# Patient Record
Sex: FEMALE | Race: WHITE | Employment: OTHER | URBAN - METROPOLITAN AREA
[De-identification: names, ages, dates, MRNs, and addresses within clinical notes are randomized per-mention and may not be internally consistent; named-entity substitution may affect disease eponyms.]

---

## 2021-12-23 ENCOUNTER — APPOINTMENT (OUTPATIENT)
Dept: GENERAL RADIOLOGY | Age: 86
End: 2021-12-23
Payer: MEDICARE

## 2021-12-23 ENCOUNTER — HOSPITAL ENCOUNTER (EMERGENCY)
Age: 86
Discharge: HOME | End: 2021-12-23
Attending: EMERGENCY MEDICINE
Payer: MEDICARE

## 2021-12-23 VITALS
DIASTOLIC BLOOD PRESSURE: 90 MMHG | BODY MASS INDEX: 25.13 KG/M2 | TEMPERATURE: 97.3 F | WEIGHT: 128 LBS | SYSTOLIC BLOOD PRESSURE: 168 MMHG | RESPIRATION RATE: 16 BRPM | OXYGEN SATURATION: 100 % | HEIGHT: 60 IN | HEART RATE: 79 BPM

## 2021-12-23 DIAGNOSIS — J40 BRONCHITIS: Primary | ICD-10-CM

## 2021-12-23 LAB
RAPID INFLUENZA  B AGN: NEGATIVE
RAPID INFLUENZA A AGN: NEGATIVE

## 2021-12-23 PROCEDURE — U0005 INFEC AGEN DETEC AMPLI PROBE: HCPCS

## 2021-12-23 PROCEDURE — U0003 INFECTIOUS AGENT DETECTION BY NUCLEIC ACID (DNA OR RNA); SEVERE ACUTE RESPIRATORY SYNDROME CORONAVIRUS 2 (SARS-COV-2) (CORONAVIRUS DISEASE [COVID-19]), AMPLIFIED PROBE TECHNIQUE, MAKING USE OF HIGH THROUGHPUT TECHNOLOGIES AS DESCRIBED BY CMS-2020-01-R: HCPCS

## 2021-12-23 PROCEDURE — 71045 X-RAY EXAM CHEST 1 VIEW: CPT

## 2021-12-23 PROCEDURE — 99284 EMERGENCY DEPT VISIT MOD MDM: CPT

## 2021-12-23 PROCEDURE — 87804 INFLUENZA ASSAY W/OPTIC: CPT

## 2021-12-23 PROCEDURE — 6370000000 HC RX 637 (ALT 250 FOR IP): Performed by: EMERGENCY MEDICINE

## 2021-12-23 RX ORDER — PREDNISONE 20 MG/1
40 TABLET ORAL DAILY
Qty: 8 TABLET | Refills: 0 | Status: SHIPPED | OUTPATIENT
Start: 2021-12-24 | End: 2021-12-28

## 2021-12-23 RX ORDER — PREDNISONE 20 MG/1
40 TABLET ORAL ONCE
Status: COMPLETED | OUTPATIENT
Start: 2021-12-23 | End: 2021-12-23

## 2021-12-23 RX ORDER — ALBUTEROL SULFATE 90 UG/1
2 AEROSOL, METERED RESPIRATORY (INHALATION) 4 TIMES DAILY PRN
Qty: 18 G | Refills: 0 | Status: SHIPPED | OUTPATIENT
Start: 2021-12-23

## 2021-12-23 RX ORDER — BENZONATATE 100 MG/1
100 CAPSULE ORAL 2 TIMES DAILY PRN
Qty: 14 CAPSULE | Refills: 0 | Status: SHIPPED | OUTPATIENT
Start: 2021-12-23 | End: 2021-12-30

## 2021-12-23 RX ORDER — ALBUTEROL SULFATE 90 UG/1
2 AEROSOL, METERED RESPIRATORY (INHALATION) ONCE
Status: COMPLETED | OUTPATIENT
Start: 2021-12-23 | End: 2021-12-23

## 2021-12-23 RX ADMIN — ALBUTEROL SULFATE 2 PUFF: 90 AEROSOL, METERED RESPIRATORY (INHALATION) at 17:58

## 2021-12-23 RX ADMIN — PREDNISONE 40 MG: 20 TABLET ORAL at 17:38

## 2021-12-23 ASSESSMENT — PAIN SCALES - GENERAL: PAINLEVEL_OUTOF10: 8

## 2021-12-23 NOTE — ED PROVIDER NOTES
EMERGENCY DEPARTMENT ENCOUNTER    Patient: Nate Raygoza  MRN: [id number]  : 1934  Date of Evaluation: 2021  ED Provider:  Soumya Mathis MD    CHIEF COMPLAINT  Chief Complaint   Patient presents with    URI       HPI  Nate Raygoza is a 80 y.o. female who presents with generalized body aches, generalized fatigue and malaise, nonproductive cough, chills and wheezing for the last week. The symptoms are moderate in severity and constant with no known trigger modifying factors. Has also had a mild frontal headache and mild nausea without emesis associated with this. Denies chest pain or difficulty breathing otherwise. Denies any other associated symptoms or complaints or concerns. Of note, she reports that she has been vaccinated for COVID-19. REVIEW OF SYSTEMS    Constitutional: negative for fever  Neurological: negative for focal weakness, loss of sensation  Ophthalmic: negative for vision change  ENT: negative for sore throat, earaches  Cardiovascular: negative for chest pain  Respiratory: negative for SOB, sputum  GI: negative for abdominal pain, vomiting, diarrhea, constipation  : negative for dysuria, hematuria  Musculoskeletal: negative for neck stiffness, decreased ROM, joint swelling  Dermatological: negative for rash, wounds  Heme: Negative for bleeding, bruising      PAST MEDICAL HISTORY  Past Medical History:   Diagnosis Date    Acid reflux     Heart disease     Hypertension     low       CURRENT MEDICATIONS  [unfilled]    ALLERGIES  No Known Allergies    SURGICAL HISTORY  Past Surgical History:   Procedure Laterality Date    ABDOMEN SURGERY      ulcer    APPENDECTOMY      CHOLECYSTECTOMY         FAMILY HISTORY  History reviewed. No pertinent family history. SOCIAL HISTORY  Social History     Socioeconomic History    Marital status:       Spouse name: None    Number of children: None    Years of education: None    Highest education level: None Occupational History    None   Tobacco Use    Smoking status: Never Smoker    Smokeless tobacco: None   Substance and Sexual Activity    Alcohol use: No    Drug use: None    Sexual activity: None   Other Topics Concern    None   Social History Narrative    None     Social Determinants of Health     Financial Resource Strain:     Difficulty of Paying Living Expenses: Not on file   Food Insecurity:     Worried About Running Out of Food in the Last Year: Not on file    Patrice of Food in the Last Year: Not on file   Transportation Needs:     Lack of Transportation (Medical): Not on file    Lack of Transportation (Non-Medical):  Not on file   Physical Activity:     Days of Exercise per Week: Not on file    Minutes of Exercise per Session: Not on file   Stress:     Feeling of Stress : Not on file   Social Connections:     Frequency of Communication with Friends and Family: Not on file    Frequency of Social Gatherings with Friends and Family: Not on file    Attends Jain Services: Not on file    Active Member of [address] [place] [address] or Organizations: Not on file    Attends Club or Organization Meetings: Not on file    Marital Status: Not on file   Intimate Partner Violence:     Fear of Current or Ex-Partner: Not on file    Emotionally Abused: Not on file    Physically Abused: Not on file    Sexually Abused: Not on file   Housing Stability:     Unable to Pay for Housing in the Last Year: Not on file    Number of [place] in the Last Year: Not on file    Unstable Housing in the Last Year: Not on file         **Past medical, family and social histories, and nursing notes reviewed and verified by me**      PHYSICAL EXAM  VITAL SIGNS:   ED Triage Vitals [12/23/21 1717]   Enc Vitals Group      BP (!) 168/90      Pulse 79      Resp 16      Temp 97.3 °F (36.3 °C)      Temp Source Infrared      SpO2 100 %      Weight 128 lb (58.1 kg)      Height 5' (1.524 m)      Head Circumference       Peak Flow Pain Score       Pain Loc       Pain Edu? Excl. in [address]? Vitals during ED course were reviewed and are as charted. Constitutional: Minimal distress, Non-toxic appearance  Eyes: Conjunctiva normal, No discharge  HENT: Normocephalic, Atraumatic, bilateral external ears normal, bilateral TMs appear normal,  no tenderness to percussion over the frontal or bilateral maxillary sinuses, posterior oropharynx is nonerythematous and without exudate, uvula is midline, oropharynx moist  Neck: Supple, no nuchal rigidity/meningismus, no stridor, no grossly visible or palpable masses  Cardiovascular: Regular rate and rhythm, No murmurs, No rubs, No gallops  Pulmonary/Chest: Diffuse bilateral wheezing, otherwise no respiratory distress or accessory muscle use, No crackles or rhonchi. Abdomen: Soft, nondistended and nonrigid, No tenderness or peritoneal signs, No masses, normal bowel sounds  Back: No midline point tenderness, No paraspinous muscle tenderness. No CVA tenderness  Extremities: No gross deformities, no edema, no tenderness  Neurologic: Normal motor function, Normal sensory function, No focal deficits  Skin: Warm, Dry, No erythema, No rash, No cyanosis, No mottling  Lymphatic: No lymphadenopathy in the following location(s): cervical  Psychiatric: Alert and oriented x3, Affect normal              RADIOLOGY/PROCEDURES/LABS/MEDICATIONS ADMINISTERED:    I have reviewed and interpreted all of the currently available lab results from this visit (if applicable):  Results for orders placed or performed during the hospital encounter of 12/23/21   Rapid Flu Swab    Specimen: Nasopharyngeal   Result Value Ref Range    Rapid Influenza A Ag NEGATIVE NEGATIVE    Rapid Influenza B Ag NEGATIVE NEGATIVE          ABNORMAL LABS:  Labs Reviewed   RAPID INFLUENZA A/B ANTIGENS   COVID-19         IMAGING STUDIES ORDERED:  XR CHEST PORTABLE    I have personally viewed the imaging studies.  The radiologist interpretation is:   XR CHEST PORTABLE   Preliminary Result   Stable chest with scarring in the right lung base. No obvious superimposed   acute process. MEDICATIONS ADMINISTERED:  Medications   albuterol sulfate  (90 Base) MCG/ACT inhaler 2 puff (2 puffs Inhalation Given 12/23/21 1758)   predniSONE (DELTASONE) tablet 40 mg (40 mg Oral Given 12/23/21 1738)         COURSE & MEDICAL DECISION MAKING  Last vitals: BP (!) 168/90   Pulse 79   Temp 97.3 °F (36.3 °C) (Infrared)   Resp 16   Ht 5' (1.524 m)   Wt 128 lb (58.1 kg)   SpO2 100%   BMI 25.00 kg/m²     80year-old female with likely viral bronchitis. No radiographic evidence or clinical suggestion of pneumonia. Vital signs reassuring and stable. Not in any respiratory distress. Of note, the patient's initial vital signs were checked right after she was roomed. She had just walked down the long hallway to the room when these were checked. Heart rate was within normal limits and her oxygen saturation was 100% on room air. Negative for influenza. COVID-19 testing has been obtained and has been sent out and pending. Was given some steroids and breathing treatment with improvement in her symptoms and wheezing. Additional workup and treatment in the ED as documented above. Patient reassured and will be discharged home. I have explained to the patient in appropriate terminology our work-up in the ED and their diagnosis. I have also given anticipatory guidance and expectant management of their condition as an outpatient as per my custom. The patient was given clear discharge and follow-up instructions including return to the ER immediately for worsening concerns. The patient has been advised to follow-up with their primary care physician and/or referred physician in the next two to three days or sooner if worsening and to return to the ER immediately as above with any concerns.  I provided the patient counseling with regard to my customary list of strict return precautions as well as return precautions specific to the cause for today's emergency department visit. The patient will return under these provided conditions, but should also return for new concerns or further worsening. Pt and/or family understand and agree with plan. Clinical Impression:  1. Bronchitis        Disposition referral (if applicable):  DO Taryn Mcneill [address] [place] [address] [address]  Magy Valdez [address]  [address]  [phone]    If symptoms worsen      Disposition medications (if applicable):  New Prescriptions    ALBUTEROL SULFATE HFA (VENTOLIN HFA) 108 (90 BASE) MCG/ACT INHALER    Inhale 2 puffs into the lungs 4 times daily as needed for Wheezing    BENZONATATE (TESSALON) 100 MG CAPSULE    Take 1 capsule by mouth 2 times daily as needed for Cough    PREDNISONE (DELTASONE) 20 MG TABLET    Take 2 tablets by mouth daily for 4 days       ED Provider Disposition Time  DISPOSITION            Electronically signed by: Shirley Douglass M.D., 12/23/2021 7:18 PM      This dictation was created with voice recognition software. While attempts have been made to review the dictation as it is transcribed, on occasion the spoken word can be misinterpreted by the technology leading to omissions or inappropriate words, phrases or sentences.         Michelle Treviño MD  12/23/21 8423

## 2021-12-25 LAB
SARS-COV-2: NOT DETECTED
SOURCE: NORMAL

## 2023-06-10 ENCOUNTER — APPOINTMENT (OUTPATIENT)
Dept: CT IMAGING | Age: 88
DRG: 064 | End: 2023-06-10
Payer: MEDICARE

## 2023-06-10 ENCOUNTER — HOSPITAL ENCOUNTER (INPATIENT)
Age: 88
LOS: 5 days | Discharge: NURSING, REHAB OR LONG-TERM CARE | DRG: 064 | End: 2023-06-16
Attending: EMERGENCY MEDICINE | Admitting: INTERNAL MEDICINE
Payer: MEDICARE

## 2023-06-10 DIAGNOSIS — R47.01 APHASIA: ICD-10-CM

## 2023-06-10 DIAGNOSIS — D64.9 ANEMIA, UNSPECIFIED TYPE: ICD-10-CM

## 2023-06-10 DIAGNOSIS — R41.82 ALTERED MENTAL STATUS, UNSPECIFIED ALTERED MENTAL STATUS TYPE: Primary | ICD-10-CM

## 2023-06-10 DIAGNOSIS — R77.8 ELEVATED TROPONIN: ICD-10-CM

## 2023-06-10 LAB
AMMONIA: 22 UMOL/L (ref 11–51)
BASOPHILS ABSOLUTE: 0 K/CU MM
BASOPHILS RELATIVE PERCENT: 0.4 % (ref 0–1)
DIFFERENTIAL TYPE: ABNORMAL
EOSINOPHILS ABSOLUTE: 0.1 K/CU MM
EOSINOPHILS RELATIVE PERCENT: 1.3 % (ref 0–3)
GLUCOSE BLD-MCNC: 123 MG/DL (ref 70–99)
HCT VFR BLD CALC: 31.3 % (ref 37–47)
HEMOGLOBIN: 8.6 GM/DL (ref 12.5–16)
IMMATURE NEUTROPHIL %: 0.5 % (ref 0–0.43)
LYMPHOCYTES ABSOLUTE: 0.9 K/CU MM
LYMPHOCYTES RELATIVE PERCENT: 8.2 % (ref 24–44)
MCH RBC QN AUTO: 24.3 PG (ref 27–31)
MCHC RBC AUTO-ENTMCNC: 27.5 % (ref 32–36)
MCV RBC AUTO: 88.4 FL (ref 78–100)
MONOCYTES ABSOLUTE: 1 K/CU MM
MONOCYTES RELATIVE PERCENT: 9.6 % (ref 0–4)
NUCLEATED RBC %: 0 %
PDW BLD-RTO: 16.6 % (ref 11.7–14.9)
PLATELET # BLD: 407 K/CU MM (ref 140–440)
PMV BLD AUTO: 9.8 FL (ref 7.5–11.1)
RBC # BLD: 3.54 M/CU MM (ref 4.2–5.4)
SEGMENTED NEUTROPHILS ABSOLUTE COUNT: 8.3 K/CU MM
SEGMENTED NEUTROPHILS RELATIVE PERCENT: 80 % (ref 36–66)
TOTAL IMMATURE NEUTOROPHIL: 0.05 K/CU MM
TOTAL NUCLEATED RBC: 0 K/CU MM
WBC # BLD: 10.4 K/CU MM (ref 4–10.5)

## 2023-06-10 PROCEDURE — 82140 ASSAY OF AMMONIA: CPT

## 2023-06-10 PROCEDURE — 84439 ASSAY OF FREE THYROXINE: CPT

## 2023-06-10 PROCEDURE — 93005 ELECTROCARDIOGRAM TRACING: CPT | Performed by: EMERGENCY MEDICINE

## 2023-06-10 PROCEDURE — 80048 BASIC METABOLIC PNL TOTAL CA: CPT

## 2023-06-10 PROCEDURE — 84484 ASSAY OF TROPONIN QUANT: CPT

## 2023-06-10 PROCEDURE — 6360000004 HC RX CONTRAST MEDICATION: Performed by: EMERGENCY MEDICINE

## 2023-06-10 PROCEDURE — 82962 GLUCOSE BLOOD TEST: CPT

## 2023-06-10 PROCEDURE — 84443 ASSAY THYROID STIM HORMONE: CPT

## 2023-06-10 PROCEDURE — 70498 CT ANGIOGRAPHY NECK: CPT

## 2023-06-10 PROCEDURE — 81003 URINALYSIS AUTO W/O SCOPE: CPT

## 2023-06-10 PROCEDURE — 85025 COMPLETE CBC W/AUTO DIFF WBC: CPT

## 2023-06-10 PROCEDURE — 82805 BLOOD GASES W/O2 SATURATION: CPT

## 2023-06-10 PROCEDURE — 99285 EMERGENCY DEPT VISIT HI MDM: CPT

## 2023-06-10 PROCEDURE — 70450 CT HEAD/BRAIN W/O DYE: CPT

## 2023-06-10 RX ADMIN — IOPAMIDOL 75 ML: 755 INJECTION, SOLUTION INTRAVENOUS at 22:47

## 2023-06-11 ENCOUNTER — APPOINTMENT (OUTPATIENT)
Dept: GENERAL RADIOLOGY | Age: 88
DRG: 064 | End: 2023-06-11
Payer: MEDICARE

## 2023-06-11 PROBLEM — R41.82 CHANGE IN MENTAL STATUS: Status: ACTIVE | Noted: 2023-06-11

## 2023-06-11 LAB
ABO/RH: NORMAL
ANION GAP SERPL CALCULATED.3IONS-SCNC: 11 MMOL/L (ref 4–16)
ANION GAP SERPL CALCULATED.3IONS-SCNC: 14 MMOL/L (ref 4–16)
ANTIBODY SCREEN: NEGATIVE
BASE EXCESS MIXED: 12 (ref 0–2.3)
BASE EXCESS MIXED: 12.2 (ref 0–2.3)
BASOPHILS ABSOLUTE: 0 K/CU MM
BASOPHILS RELATIVE PERCENT: 0.3 % (ref 0–1)
BILIRUBIN URINE: NEGATIVE MG/DL
BLOOD, URINE: NEGATIVE
BUN SERPL-MCNC: 23 MG/DL (ref 6–23)
BUN SERPL-MCNC: 25 MG/DL (ref 6–23)
CALCIUM SERPL-MCNC: 7.4 MG/DL (ref 8.3–10.6)
CALCIUM SERPL-MCNC: 7.4 MG/DL (ref 8.3–10.6)
CHLORIDE BLD-SCNC: 94 MMOL/L (ref 99–110)
CHLORIDE BLD-SCNC: 95 MMOL/L (ref 99–110)
CHOLEST SERPL-MCNC: 97 MG/DL
CLARITY: CLEAR
CO2: 29 MMOL/L (ref 21–32)
CO2: 33 MMOL/L (ref 21–32)
COLOR: YELLOW
COMMENT UA: NORMAL
COMMENT: ABNORMAL
CREAT SERPL-MCNC: 0.8 MG/DL (ref 0.6–1.1)
CREAT SERPL-MCNC: 0.9 MG/DL (ref 0.6–1.1)
DIFFERENTIAL TYPE: ABNORMAL
DIGOXIN LEVEL: 1 NG/ML (ref 0.8–2)
DOSE AMOUNT: NORMAL
DOSE TIME: NORMAL
EKG ATRIAL RATE: 78 BPM
EKG DIAGNOSIS: NORMAL
EKG Q-T INTERVAL: 534 MS
EKG QRS DURATION: 64 MS
EKG QTC CALCULATION (BAZETT): 584 MS
EKG R AXIS: 8 DEGREES
EKG T AXIS: 237 DEGREES
EKG VENTRICULAR RATE: 72 BPM
EOSINOPHILS ABSOLUTE: 0 K/CU MM
EOSINOPHILS RELATIVE PERCENT: 0.2 % (ref 0–3)
GFR SERPL CREATININE-BSD FRML MDRD: >60 ML/MIN/1.73M2
GFR SERPL CREATININE-BSD FRML MDRD: >60 ML/MIN/1.73M2
GLUCOSE SERPL-MCNC: 105 MG/DL (ref 70–99)
GLUCOSE SERPL-MCNC: 114 MG/DL (ref 70–99)
GLUCOSE, URINE: NEGATIVE MG/DL
HCO3 VENOUS: 34.4 MMOL/L (ref 19–25)
HCO3 VENOUS: 39.3 MMOL/L (ref 19–25)
HCT VFR BLD CALC: 31.9 % (ref 37–47)
HDLC SERPL-MCNC: 30 MG/DL
HEMOGLOBIN: 8.8 GM/DL (ref 12.5–16)
IMMATURE NEUTROPHIL %: 0.3 % (ref 0–0.43)
KETONES, URINE: NEGATIVE MG/DL
LDLC SERPL CALC-MCNC: 40 MG/DL
LEUKOCYTE ESTERASE, URINE: NEGATIVE
LYMPHOCYTES ABSOLUTE: 0.3 K/CU MM
LYMPHOCYTES RELATIVE PERCENT: 3 % (ref 24–44)
MCH RBC QN AUTO: 24.4 PG (ref 27–31)
MCHC RBC AUTO-ENTMCNC: 27.6 % (ref 32–36)
MCV RBC AUTO: 88.6 FL (ref 78–100)
MONOCYTES ABSOLUTE: 0.5 K/CU MM
MONOCYTES RELATIVE PERCENT: 5.1 % (ref 0–4)
NITRITE URINE, QUANTITATIVE: NEGATIVE
NUCLEATED RBC %: 0 %
O2 SAT, VEN: 87.7 % (ref 50–70)
O2 SAT, VEN: 90.5 % (ref 50–70)
PCO2, VEN: 35 MMHG (ref 38–52)
PCO2, VEN: 62 MMHG (ref 38–52)
PDW BLD-RTO: 16.4 % (ref 11.7–14.9)
PH VENOUS: 7.41 (ref 7.32–7.42)
PH VENOUS: 7.6 (ref 7.32–7.42)
PH, URINE: 8 (ref 5–8)
PLATELET # BLD: 401 K/CU MM (ref 140–440)
PMV BLD AUTO: 10.1 FL (ref 7.5–11.1)
PO2, VEN: 220 MMHG (ref 28–48)
PO2, VEN: 74 MMHG (ref 28–48)
POTASSIUM SERPL-SCNC: 3.6 MMOL/L (ref 3.5–5.1)
POTASSIUM SERPL-SCNC: 4.1 MMOL/L (ref 3.5–5.1)
PROTEIN UA: NEGATIVE MG/DL
RBC # BLD: 3.6 M/CU MM (ref 4.2–5.4)
SEGMENTED NEUTROPHILS ABSOLUTE COUNT: 8.1 K/CU MM
SEGMENTED NEUTROPHILS RELATIVE PERCENT: 91.1 % (ref 36–66)
SODIUM BLD-SCNC: 138 MMOL/L (ref 135–145)
SODIUM BLD-SCNC: 138 MMOL/L (ref 135–145)
SPECIFIC GRAVITY UA: 1.01 (ref 1–1.03)
T4 FREE SERPL-MCNC: 1.5 NG/DL (ref 0.9–1.8)
TOTAL IMMATURE NEUTOROPHIL: 0.03 K/CU MM
TOTAL NUCLEATED RBC: 0 K/CU MM
TRIGL SERPL-MCNC: 135 MG/DL
TROPONIN T: 0.01 NG/ML
TROPONIN T: 0.02 NG/ML
TSH SERPL DL<=0.005 MIU/L-ACNC: 1.37 UIU/ML (ref 0.27–4.2)
UROBILINOGEN, URINE: 0.2 MG/DL (ref 0.2–1)
WBC # BLD: 8.9 K/CU MM (ref 4–10.5)

## 2023-06-11 PROCEDURE — 6360000002 HC RX W HCPCS: Performed by: INTERNAL MEDICINE

## 2023-06-11 PROCEDURE — 86901 BLOOD TYPING SEROLOGIC RH(D): CPT

## 2023-06-11 PROCEDURE — 82805 BLOOD GASES W/O2 SATURATION: CPT

## 2023-06-11 PROCEDURE — 99223 1ST HOSP IP/OBS HIGH 75: CPT | Performed by: PSYCHIATRY & NEUROLOGY

## 2023-06-11 PROCEDURE — C9113 INJ PANTOPRAZOLE SODIUM, VIA: HCPCS | Performed by: INTERNAL MEDICINE

## 2023-06-11 PROCEDURE — 94761 N-INVAS EAR/PLS OXIMETRY MLT: CPT

## 2023-06-11 PROCEDURE — 71045 X-RAY EXAM CHEST 1 VIEW: CPT

## 2023-06-11 PROCEDURE — 1200000000 HC SEMI PRIVATE

## 2023-06-11 PROCEDURE — 2700000000 HC OXYGEN THERAPY PER DAY

## 2023-06-11 PROCEDURE — 97163 PT EVAL HIGH COMPLEX 45 MIN: CPT

## 2023-06-11 PROCEDURE — 80048 BASIC METABOLIC PNL TOTAL CA: CPT

## 2023-06-11 PROCEDURE — 86900 BLOOD TYPING SEROLOGIC ABO: CPT

## 2023-06-11 PROCEDURE — 93010 ELECTROCARDIOGRAM REPORT: CPT | Performed by: INTERNAL MEDICINE

## 2023-06-11 PROCEDURE — 86850 RBC ANTIBODY SCREEN: CPT

## 2023-06-11 PROCEDURE — 84484 ASSAY OF TROPONIN QUANT: CPT

## 2023-06-11 PROCEDURE — 2580000003 HC RX 258: Performed by: INTERNAL MEDICINE

## 2023-06-11 PROCEDURE — 51702 INSERT TEMP BLADDER CATH: CPT

## 2023-06-11 PROCEDURE — 4A03X5D MEASUREMENT OF ARTERIAL FLOW, INTRACRANIAL, EXTERNAL APPROACH: ICD-10-PCS | Performed by: PSYCHIATRY & NEUROLOGY

## 2023-06-11 PROCEDURE — 80061 LIPID PANEL: CPT

## 2023-06-11 PROCEDURE — 85025 COMPLETE CBC W/AUTO DIFF WBC: CPT

## 2023-06-11 PROCEDURE — 36415 COLL VENOUS BLD VENIPUNCTURE: CPT

## 2023-06-11 PROCEDURE — 80162 ASSAY OF DIGOXIN TOTAL: CPT

## 2023-06-11 RX ORDER — FUROSEMIDE 40 MG/1
80 TABLET ORAL DAILY
Status: ON HOLD | COMMUNITY

## 2023-06-11 RX ORDER — ONDANSETRON 2 MG/ML
4 INJECTION INTRAMUSCULAR; INTRAVENOUS EVERY 6 HOURS PRN
Status: DISCONTINUED | OUTPATIENT
Start: 2023-06-11 | End: 2023-06-16 | Stop reason: HOSPADM

## 2023-06-11 RX ORDER — PROCHLORPERAZINE MALEATE 5 MG/1
5 TABLET ORAL EVERY 6 HOURS PRN
Status: ON HOLD | COMMUNITY

## 2023-06-11 RX ORDER — ROPINIROLE 2 MG/1
2 TABLET, FILM COATED ORAL NIGHTLY
Status: ON HOLD | COMMUNITY

## 2023-06-11 RX ORDER — DIGOXIN 125 MCG
125 TABLET ORAL DAILY
Status: ON HOLD | COMMUNITY

## 2023-06-11 RX ORDER — ENOXAPARIN SODIUM 100 MG/ML
40 INJECTION SUBCUTANEOUS DAILY
Status: DISCONTINUED | OUTPATIENT
Start: 2023-06-11 | End: 2023-06-15

## 2023-06-11 RX ORDER — IRON ASPGLY,PS/C/B12/FA/CA/SUC 150-25-1
1 CAPSULE ORAL 2 TIMES DAILY WITH MEALS
Status: ON HOLD | COMMUNITY

## 2023-06-11 RX ORDER — ALPRAZOLAM 1 MG/1
1 TABLET ORAL EVERY 12 HOURS
Status: ON HOLD | COMMUNITY

## 2023-06-11 RX ORDER — POTASSIUM CHLORIDE 20 MEQ/1
20 TABLET, EXTENDED RELEASE ORAL DAILY
Status: ON HOLD | COMMUNITY

## 2023-06-11 RX ORDER — POLYETHYLENE GLYCOL 3350 17 G/17G
17 POWDER, FOR SOLUTION ORAL DAILY PRN
Status: DISCONTINUED | OUTPATIENT
Start: 2023-06-11 | End: 2023-06-16 | Stop reason: HOSPADM

## 2023-06-11 RX ORDER — POLYETHYLENE GLYCOL 3350 17 G/17G
17 POWDER, FOR SOLUTION ORAL DAILY PRN
Status: ON HOLD | COMMUNITY

## 2023-06-11 RX ORDER — ACETAMINOPHEN 650 MG/1
650 SUPPOSITORY RECTAL EVERY 4 HOURS PRN
Status: DISCONTINUED | OUTPATIENT
Start: 2023-06-11 | End: 2023-06-16 | Stop reason: HOSPADM

## 2023-06-11 RX ORDER — DEXTROSE AND SODIUM CHLORIDE 5; .9 G/100ML; G/100ML
INJECTION, SOLUTION INTRAVENOUS CONTINUOUS
Status: DISCONTINUED | OUTPATIENT
Start: 2023-06-11 | End: 2023-06-14

## 2023-06-11 RX ORDER — ATORVASTATIN CALCIUM 40 MG/1
40 TABLET, FILM COATED ORAL NIGHTLY
Status: DISCONTINUED | OUTPATIENT
Start: 2023-06-11 | End: 2023-06-16 | Stop reason: HOSPADM

## 2023-06-11 RX ORDER — PANTOPRAZOLE SODIUM 40 MG/10ML
40 INJECTION, POWDER, LYOPHILIZED, FOR SOLUTION INTRAVENOUS DAILY
Status: DISCONTINUED | OUTPATIENT
Start: 2023-06-11 | End: 2023-06-12

## 2023-06-11 RX ORDER — HYDROCODONE BITARTRATE AND ACETAMINOPHEN 10; 325 MG/1; MG/1
1 TABLET ORAL EVERY 6 HOURS PRN
Status: ON HOLD | COMMUNITY

## 2023-06-11 RX ORDER — PHENAZOPYRIDINE HYDROCHLORIDE 200 MG/1
200 TABLET, FILM COATED ORAL 3 TIMES DAILY PRN
Status: ON HOLD | COMMUNITY

## 2023-06-11 RX ORDER — ONDANSETRON 4 MG/1
4 TABLET, ORALLY DISINTEGRATING ORAL EVERY 8 HOURS PRN
Status: DISCONTINUED | OUTPATIENT
Start: 2023-06-11 | End: 2023-06-16 | Stop reason: HOSPADM

## 2023-06-11 RX ADMIN — ENOXAPARIN SODIUM 40 MG: 100 INJECTION SUBCUTANEOUS at 09:10

## 2023-06-11 RX ADMIN — PANTOPRAZOLE SODIUM 40 MG: 40 INJECTION, POWDER, FOR SOLUTION INTRAVENOUS at 21:01

## 2023-06-11 RX ADMIN — DEXTROSE AND SODIUM CHLORIDE: 5; 900 INJECTION, SOLUTION INTRAVENOUS at 21:01

## 2023-06-11 RX ADMIN — CEFTRIAXONE SODIUM 1000 MG: 1 INJECTION, POWDER, FOR SOLUTION INTRAMUSCULAR; INTRAVENOUS at 05:28

## 2023-06-12 ENCOUNTER — APPOINTMENT (OUTPATIENT)
Dept: ULTRASOUND IMAGING | Age: 88
DRG: 064 | End: 2023-06-12
Payer: MEDICARE

## 2023-06-12 ENCOUNTER — APPOINTMENT (OUTPATIENT)
Dept: MRI IMAGING | Age: 88
DRG: 064 | End: 2023-06-12
Payer: MEDICARE

## 2023-06-12 PROBLEM — I63.9 CEREBROVASCULAR ACCIDENT (CVA) DUE TO EMBOLISM (HCC): Status: ACTIVE | Noted: 2023-06-12

## 2023-06-12 PROBLEM — I48.19 PERSISTENT ATRIAL FIBRILLATION (HCC): Status: ACTIVE | Noted: 2023-06-12

## 2023-06-12 LAB
ALBUMIN SERPL-MCNC: 2.4 GM/DL (ref 3.4–5)
ALBUMIN SERPL-MCNC: 2.6 GM/DL (ref 3.4–5)
ALP BLD-CCNC: 148 IU/L (ref 40–128)
ALP BLD-CCNC: 151 IU/L (ref 40–129)
ALT SERPL-CCNC: 13 U/L (ref 10–40)
ALT SERPL-CCNC: 13 U/L (ref 10–40)
ANION GAP SERPL CALCULATED.3IONS-SCNC: 10 MMOL/L (ref 4–16)
ANION GAP SERPL CALCULATED.3IONS-SCNC: 13 MMOL/L (ref 4–16)
AST SERPL-CCNC: 16 IU/L (ref 15–37)
AST SERPL-CCNC: 22 IU/L (ref 15–37)
B PARAP IS1001 DNA NPH QL NAA+NON-PROBE: NOT DETECTED
B PERT.PT PRMT NPH QL NAA+NON-PROBE: NOT DETECTED
BASE EXCESS MIXED: 10.6 (ref 0–2.3)
BASOPHILS ABSOLUTE: 0 K/CU MM
BASOPHILS ABSOLUTE: 0 K/CU MM
BASOPHILS RELATIVE PERCENT: 0.1 % (ref 0–1)
BASOPHILS RELATIVE PERCENT: 0.3 % (ref 0–1)
BILIRUB SERPL-MCNC: 0.2 MG/DL (ref 0–1)
BILIRUB SERPL-MCNC: 0.2 MG/DL (ref 0–1)
BUN SERPL-MCNC: 21 MG/DL (ref 6–23)
BUN SERPL-MCNC: 22 MG/DL (ref 6–23)
C PNEUM DNA NPH QL NAA+NON-PROBE: NOT DETECTED
CALCIUM SERPL-MCNC: 7.7 MG/DL (ref 8.3–10.6)
CALCIUM SERPL-MCNC: 7.7 MG/DL (ref 8.3–10.6)
CHLORIDE BLD-SCNC: 102 MMOL/L (ref 99–110)
CHLORIDE BLD-SCNC: 98 MMOL/L (ref 99–110)
CO2: 27 MMOL/L (ref 21–32)
CO2: 30 MMOL/L (ref 21–32)
COMMENT: ABNORMAL
CREAT SERPL-MCNC: 0.8 MG/DL (ref 0.6–1.1)
CREAT SERPL-MCNC: 0.9 MG/DL (ref 0.6–1.1)
DIFFERENTIAL TYPE: ABNORMAL
DIFFERENTIAL TYPE: ABNORMAL
EKG ATRIAL RATE: 40 BPM
EKG DIAGNOSIS: NORMAL
EKG Q-T INTERVAL: 288 MS
EKG QRS DURATION: 70 MS
EKG QTC CALCULATION (BAZETT): 315 MS
EKG R AXIS: 29 DEGREES
EKG T AXIS: 228 DEGREES
EKG VENTRICULAR RATE: 72 BPM
EOSINOPHILS ABSOLUTE: 0 K/CU MM
EOSINOPHILS ABSOLUTE: 0 K/CU MM
EOSINOPHILS RELATIVE PERCENT: 0 % (ref 0–3)
EOSINOPHILS RELATIVE PERCENT: 0 % (ref 0–3)
FLUAV H1 2009 PAN RNA NPH NAA+NON-PROBE: NOT DETECTED
FLUAV H1 RNA NPH QL NAA+NON-PROBE: NOT DETECTED
FLUAV H3 RNA NPH QL NAA+NON-PROBE: NOT DETECTED
FLUAV RNA NPH QL NAA+NON-PROBE: NOT DETECTED
FLUBV RNA NPH QL NAA+NON-PROBE: NOT DETECTED
GFR SERPL CREATININE-BSD FRML MDRD: >60 ML/MIN/1.73M2
GFR SERPL CREATININE-BSD FRML MDRD: >60 ML/MIN/1.73M2
GLUCOSE SERPL-MCNC: 126 MG/DL (ref 70–99)
GLUCOSE SERPL-MCNC: 154 MG/DL (ref 70–99)
HADV DNA NPH QL NAA+NON-PROBE: NOT DETECTED
HCO3 VENOUS: 35.7 MMOL/L (ref 19–25)
HCOV 229E RNA NPH QL NAA+NON-PROBE: NOT DETECTED
HCOV HKU1 RNA NPH QL NAA+NON-PROBE: NOT DETECTED
HCOV NL63 RNA NPH QL NAA+NON-PROBE: NOT DETECTED
HCOV OC43 RNA NPH QL NAA+NON-PROBE: NOT DETECTED
HCT VFR BLD CALC: 27.3 % (ref 37–47)
HCT VFR BLD CALC: 31.4 % (ref 37–47)
HEMOGLOBIN: 7.4 GM/DL (ref 12.5–16)
HEMOGLOBIN: 8.4 GM/DL (ref 12.5–16)
HMPV RNA NPH QL NAA+NON-PROBE: NOT DETECTED
HPIV1 RNA NPH QL NAA+NON-PROBE: NOT DETECTED
HPIV2 RNA NPH QL NAA+NON-PROBE: NOT DETECTED
HPIV3 RNA NPH QL NAA+NON-PROBE: NOT DETECTED
HPIV4 RNA NPH QL NAA+NON-PROBE: NOT DETECTED
IMMATURE NEUTROPHIL %: 0.5 % (ref 0–0.43)
IMMATURE NEUTROPHIL %: 0.6 % (ref 0–0.43)
LACTIC ACID, SEPSIS: 1.2 MMOL/L (ref 0.5–1.9)
LACTIC ACID, SEPSIS: 2.2 MMOL/L (ref 0.5–1.9)
LV EF: 58 %
LVEF MODALITY: NORMAL
LYMPHOCYTES ABSOLUTE: 0.5 K/CU MM
LYMPHOCYTES ABSOLUTE: 0.5 K/CU MM
LYMPHOCYTES RELATIVE PERCENT: 5.7 % (ref 24–44)
LYMPHOCYTES RELATIVE PERCENT: 5.7 % (ref 24–44)
M PNEUMO DNA NPH QL NAA+NON-PROBE: NOT DETECTED
MAGNESIUM: 2.4 MG/DL (ref 1.8–2.4)
MCH RBC QN AUTO: 23.6 PG (ref 27–31)
MCH RBC QN AUTO: 24.2 PG (ref 27–31)
MCHC RBC AUTO-ENTMCNC: 26.8 % (ref 32–36)
MCHC RBC AUTO-ENTMCNC: 27.1 % (ref 32–36)
MCV RBC AUTO: 86.9 FL (ref 78–100)
MCV RBC AUTO: 90.5 FL (ref 78–100)
MONOCYTES ABSOLUTE: 0.6 K/CU MM
MONOCYTES ABSOLUTE: 0.8 K/CU MM
MONOCYTES RELATIVE PERCENT: 6.8 % (ref 0–4)
MONOCYTES RELATIVE PERCENT: 8.9 % (ref 0–4)
NUCLEATED RBC %: 0 %
NUCLEATED RBC %: 0 %
O2 SAT, VEN: 89 % (ref 50–70)
PCO2, VEN: 48 MMHG (ref 38–52)
PDW BLD-RTO: 16.6 % (ref 11.7–14.9)
PDW BLD-RTO: 16.6 % (ref 11.7–14.9)
PH VENOUS: 7.48 (ref 7.32–7.42)
PLATELET # BLD: 381 K/CU MM (ref 140–440)
PLATELET # BLD: 417 K/CU MM (ref 140–440)
PMV BLD AUTO: 9.4 FL (ref 7.5–11.1)
PMV BLD AUTO: 9.8 FL (ref 7.5–11.1)
PO2, VEN: 60 MMHG (ref 28–48)
POTASSIUM SERPL-SCNC: 3.9 MMOL/L (ref 3.5–5.1)
POTASSIUM SERPL-SCNC: 4 MMOL/L (ref 3.5–5.1)
PRO-BNP: 6899 PG/ML
PROCALCITONIN SERPL-MCNC: 0.14 NG/ML
RBC # BLD: 3.14 M/CU MM (ref 4.2–5.4)
RBC # BLD: 3.47 M/CU MM (ref 4.2–5.4)
RSV RNA NPH QL NAA+NON-PROBE: NOT DETECTED
RV+EV RNA NPH QL NAA+NON-PROBE: NOT DETECTED
SARS-COV-2 RNA NPH QL NAA+NON-PROBE: NOT DETECTED
SEGMENTED NEUTROPHILS ABSOLUTE COUNT: 7.4 K/CU MM
SEGMENTED NEUTROPHILS ABSOLUTE COUNT: 7.7 K/CU MM
SEGMENTED NEUTROPHILS RELATIVE PERCENT: 84.5 % (ref 36–66)
SEGMENTED NEUTROPHILS RELATIVE PERCENT: 86.9 % (ref 36–66)
SODIUM BLD-SCNC: 138 MMOL/L (ref 135–145)
SODIUM BLD-SCNC: 142 MMOL/L (ref 135–145)
TOTAL IMMATURE NEUTOROPHIL: 0.04 K/CU MM
TOTAL IMMATURE NEUTOROPHIL: 0.05 K/CU MM
TOTAL NUCLEATED RBC: 0 K/CU MM
TOTAL NUCLEATED RBC: 0 K/CU MM
TOTAL PROTEIN: 4.3 GM/DL (ref 6.4–8.2)
TOTAL PROTEIN: 4.8 GM/DL (ref 6.4–8.2)
WBC # BLD: 8.7 K/CU MM (ref 4–10.5)
WBC # BLD: 8.8 K/CU MM (ref 4–10.5)

## 2023-06-12 PROCEDURE — 1200000000 HC SEMI PRIVATE

## 2023-06-12 PROCEDURE — 6360000002 HC RX W HCPCS: Performed by: INTERNAL MEDICINE

## 2023-06-12 PROCEDURE — 6360000002 HC RX W HCPCS: Performed by: HOSPITALIST

## 2023-06-12 PROCEDURE — 6360000002 HC RX W HCPCS: Performed by: FAMILY MEDICINE

## 2023-06-12 PROCEDURE — 99232 SBSQ HOSP IP/OBS MODERATE 35: CPT | Performed by: NURSE PRACTITIONER

## 2023-06-12 PROCEDURE — 82805 BLOOD GASES W/O2 SATURATION: CPT

## 2023-06-12 PROCEDURE — A9579 GAD-BASE MR CONTRAST NOS,1ML: HCPCS | Performed by: INTERNAL MEDICINE

## 2023-06-12 PROCEDURE — 83880 ASSAY OF NATRIURETIC PEPTIDE: CPT

## 2023-06-12 PROCEDURE — 93010 ELECTROCARDIOGRAM REPORT: CPT | Performed by: INTERNAL MEDICINE

## 2023-06-12 PROCEDURE — 2580000003 HC RX 258: Performed by: INTERNAL MEDICINE

## 2023-06-12 PROCEDURE — C9113 INJ PANTOPRAZOLE SODIUM, VIA: HCPCS | Performed by: HOSPITALIST

## 2023-06-12 PROCEDURE — 2580000003 HC RX 258: Performed by: FAMILY MEDICINE

## 2023-06-12 PROCEDURE — 6360000002 HC RX W HCPCS: Performed by: NURSE PRACTITIONER

## 2023-06-12 PROCEDURE — 99223 1ST HOSP IP/OBS HIGH 75: CPT | Performed by: INTERNAL MEDICINE

## 2023-06-12 PROCEDURE — 87040 BLOOD CULTURE FOR BACTERIA: CPT

## 2023-06-12 PROCEDURE — 70553 MRI BRAIN STEM W/O & W/DYE: CPT

## 2023-06-12 PROCEDURE — 93005 ELECTROCARDIOGRAM TRACING: CPT | Performed by: FAMILY MEDICINE

## 2023-06-12 PROCEDURE — 83735 ASSAY OF MAGNESIUM: CPT

## 2023-06-12 PROCEDURE — 36415 COLL VENOUS BLD VENIPUNCTURE: CPT

## 2023-06-12 PROCEDURE — 6360000004 HC RX CONTRAST MEDICATION: Performed by: INTERNAL MEDICINE

## 2023-06-12 PROCEDURE — 80053 COMPREHEN METABOLIC PANEL: CPT

## 2023-06-12 PROCEDURE — 93971 EXTREMITY STUDY: CPT

## 2023-06-12 PROCEDURE — 93306 TTE W/DOPPLER COMPLETE: CPT

## 2023-06-12 PROCEDURE — 85025 COMPLETE CBC W/AUTO DIFF WBC: CPT

## 2023-06-12 PROCEDURE — 0202U NFCT DS 22 TRGT SARS-COV-2: CPT

## 2023-06-12 PROCEDURE — 84145 PROCALCITONIN (PCT): CPT

## 2023-06-12 PROCEDURE — 92610 EVALUATE SWALLOWING FUNCTION: CPT

## 2023-06-12 PROCEDURE — 94761 N-INVAS EAR/PLS OXIMETRY MLT: CPT

## 2023-06-12 PROCEDURE — 76937 US GUIDE VASCULAR ACCESS: CPT

## 2023-06-12 PROCEDURE — C9113 INJ PANTOPRAZOLE SODIUM, VIA: HCPCS | Performed by: INTERNAL MEDICINE

## 2023-06-12 PROCEDURE — 83605 ASSAY OF LACTIC ACID: CPT

## 2023-06-12 PROCEDURE — 6370000000 HC RX 637 (ALT 250 FOR IP): Performed by: HOSPITALIST

## 2023-06-12 RX ORDER — MAGNESIUM SULFATE IN WATER 40 MG/ML
2000 INJECTION, SOLUTION INTRAVENOUS PRN
Status: DISCONTINUED | OUTPATIENT
Start: 2023-06-12 | End: 2023-06-16 | Stop reason: HOSPADM

## 2023-06-12 RX ORDER — PANTOPRAZOLE SODIUM 40 MG/10ML
40 INJECTION, POWDER, LYOPHILIZED, FOR SOLUTION INTRAVENOUS 2 TIMES DAILY
Status: DISCONTINUED | OUTPATIENT
Start: 2023-06-12 | End: 2023-06-16 | Stop reason: HOSPADM

## 2023-06-12 RX ORDER — POTASSIUM CHLORIDE 20 MEQ/1
40 TABLET, EXTENDED RELEASE ORAL PRN
Status: DISCONTINUED | OUTPATIENT
Start: 2023-06-12 | End: 2023-06-16 | Stop reason: HOSPADM

## 2023-06-12 RX ORDER — FUROSEMIDE 10 MG/ML
40 INJECTION INTRAMUSCULAR; INTRAVENOUS DAILY
Status: DISCONTINUED | OUTPATIENT
Start: 2023-06-12 | End: 2023-06-14

## 2023-06-12 RX ORDER — POTASSIUM CHLORIDE 7.45 MG/ML
10 INJECTION INTRAVENOUS PRN
Status: DISCONTINUED | OUTPATIENT
Start: 2023-06-12 | End: 2023-06-16 | Stop reason: HOSPADM

## 2023-06-12 RX ADMIN — GADOTERIDOL 12 ML: 279.3 INJECTION, SOLUTION INTRAVENOUS at 11:15

## 2023-06-12 RX ADMIN — CEFEPIME 2000 MG: 2 INJECTION, POWDER, FOR SOLUTION INTRAVENOUS at 15:53

## 2023-06-12 RX ADMIN — DEXTROSE AND SODIUM CHLORIDE: 5; 900 INJECTION, SOLUTION INTRAVENOUS at 09:49

## 2023-06-12 RX ADMIN — ENOXAPARIN SODIUM 40 MG: 100 INJECTION SUBCUTANEOUS at 09:37

## 2023-06-12 RX ADMIN — PANTOPRAZOLE SODIUM 40 MG: 40 INJECTION, POWDER, FOR SOLUTION INTRAVENOUS at 09:37

## 2023-06-12 RX ADMIN — ACETAMINOPHEN 650 MG: 650 SUPPOSITORY RECTAL at 03:41

## 2023-06-12 RX ADMIN — VANCOMYCIN HYDROCHLORIDE 1000 MG: 1 INJECTION, POWDER, LYOPHILIZED, FOR SOLUTION INTRAVENOUS at 05:43

## 2023-06-12 RX ADMIN — HYDROMORPHONE HYDROCHLORIDE 0.25 MG: 1 INJECTION, SOLUTION INTRAMUSCULAR; INTRAVENOUS; SUBCUTANEOUS at 23:59

## 2023-06-12 RX ADMIN — PANTOPRAZOLE SODIUM 40 MG: 40 INJECTION, POWDER, FOR SOLUTION INTRAVENOUS at 22:36

## 2023-06-12 RX ADMIN — FUROSEMIDE 40 MG: 10 INJECTION, SOLUTION INTRAMUSCULAR; INTRAVENOUS at 12:54

## 2023-06-12 RX ADMIN — CEFEPIME 2000 MG: 2 INJECTION, POWDER, FOR SOLUTION INTRAVENOUS at 03:55

## 2023-06-12 NOTE — CARE COORDINATION
LSW spoke with family. Pt unable to talk with this LSW. Pt lives with her son and DIL in a 1 story home. Pt has a walker and cane at home. Pt kimble snot have HC. Family stated pt was independent prior to admission. Pt has a PCP and has insurance to help with healthcare cost.   LSW spoke with family about possible ARU vs SNF at discharge. Pt is agreeable. LSW will wait for PT/OT evals and their recs. Pt has [place] and will need a precert. CM following.           06/12/23 9974   Service Assessment   Patient Orientation Unresponsive   Cognition Severely Impaired   History Provided By Child/Family   Primary Caregiver Family   Support Systems Children;Family Members   Patient's Healthcare Decision Maker is: Legal Next of Kin   PCP Verified by CM Yes   Last Visit to PCP Within last 3 months   Prior Functional Level Independent in ADLs/IADLs  (independent prior to admission.)   Current Functional Level Assistance with the following:;Bathing;Dressing; Toileting;Feeding   Can patient return to prior living arrangement No   Ability to make needs known: Unable   Family able to assist with home care needs: Yes   Would you like for me to discuss the discharge plan with any other family members/significant others, and if so, who?  Yes

## 2023-06-12 NOTE — CONSULTS
CARDIOLOGY CONSULT NOTE   Reason for consultation:  Tea Mcclure    Referring physician:  Julia Pandey MD     Primary care physician: Guerrero Lopez DO      Dear  Dr. Julia Pandey MD   Thanks for the consult. Chief Complaints :  Chief Complaint   Patient presents with    Altered Mental Status        History of present illness:Nita is a 80 y. o.year old who presents with new onset aphasia as well as right lower extremities and upper extremity weakness which is new for her suddenly started out of the blue she was actually admitted at [address] [place] [address] with congestive heart failure and anemia with GI bleeding about a week ago  Bedside reports at baseline she is quite independent she was supposed to have outpatient watchman which initially she was refusing she has history of multiple episodes of GI bleeding and chronic anemia thought to be secondary to GI bleeding requiring IV infusions and she is not on anticoagulation in spite of having a renal and splenic artery infarct which was identified last week. longstanding history of peptic ulcer disease with remote history of Billroth II operation around 2000 timeframe due to history of gastric perforation. Does have history of recurrent GI bleed. Hospitalized in January 2022 with acute GI symptoms and found with unexplained cytopenias. And then again in June 2023 of active ulcer at the anastomosis site on endoscopy at [address] [place] [address]      Past medical history:    has no past medical history on file. Past surgical history:   has no past surgical history on file.   Social History:     Family history:   no family history of CAD, STROKE of DM at early age    Allergies   Allergen Reactions    Motrin [Ibuprofen]     Tramadol        ceFEPIme (MAXIPIME) 2,000 mg in sodium chloride 0.9 % 50 mL IVPB (mini-bag), Q12H  vancomycin (VANCOCIN) 1,000 mg in sodium chloride 0.9 % 250 mL IVPB ([id number]), Q24H  potassium chloride (KLOR-CON M) extended release tablet 40 mEq,

## 2023-06-12 NOTE — CONSULTS
Rd consulted for wounds. Pt does not have wounds charted. Will follow per protocol.     Electronically signed by Jose Garcia RD, LD on 7/93/9462 at 7:59 PM  [phone]

## 2023-06-12 NOTE — CONSULTS
Patient has large IV infiltration in ight ARM. PIVs x2 removed, JAUN Elevated on pillow. IV Consult complete. Nexiva 20g 1.75\" PIV Inserted in Left Forearm  x 1 attempt using sterile ultrasound guided technique. Brisk blood return, flushes easy.

## 2023-06-13 ENCOUNTER — APPOINTMENT (OUTPATIENT)
Dept: GENERAL RADIOLOGY | Age: 88
DRG: 064 | End: 2023-06-13
Payer: MEDICARE

## 2023-06-13 LAB
ALBUMIN SERPL-MCNC: 2.4 GM/DL (ref 3.4–5)
ALP BLD-CCNC: 137 IU/L (ref 40–129)
ALT SERPL-CCNC: 11 U/L (ref 10–40)
ANION GAP SERPL CALCULATED.3IONS-SCNC: 12 MMOL/L (ref 4–16)
AST SERPL-CCNC: 22 IU/L (ref 15–37)
BASOPHILS ABSOLUTE: 0 K/CU MM
BASOPHILS RELATIVE PERCENT: 0.5 % (ref 0–1)
BILIRUB SERPL-MCNC: 0.2 MG/DL (ref 0–1)
BUN SERPL-MCNC: 19 MG/DL (ref 6–23)
CALCIUM SERPL-MCNC: 7.7 MG/DL (ref 8.3–10.6)
CHLORIDE BLD-SCNC: 105 MMOL/L (ref 99–110)
CO2: 25 MMOL/L (ref 21–32)
CREAT SERPL-MCNC: 0.7 MG/DL (ref 0.6–1.1)
DIFFERENTIAL TYPE: ABNORMAL
EOSINOPHILS ABSOLUTE: 0 K/CU MM
EOSINOPHILS RELATIVE PERCENT: 0 % (ref 0–3)
GFR SERPL CREATININE-BSD FRML MDRD: >60 ML/MIN/1.73M2
GLUCOSE SERPL-MCNC: 147 MG/DL (ref 70–99)
HCT VFR BLD CALC: 32.3 % (ref 37–47)
HEMOGLOBIN: 7.9 GM/DL (ref 12.5–16)
IMMATURE NEUTROPHIL %: 0.3 % (ref 0–0.43)
LYMPHOCYTES ABSOLUTE: 0.6 K/CU MM
LYMPHOCYTES RELATIVE PERCENT: 7.4 % (ref 24–44)
MAGNESIUM: 2.4 MG/DL (ref 1.8–2.4)
MCH RBC QN AUTO: 23.5 PG (ref 27–31)
MCHC RBC AUTO-ENTMCNC: 24.5 % (ref 32–36)
MCV RBC AUTO: 96.1 FL (ref 78–100)
MONOCYTES ABSOLUTE: 0.7 K/CU MM
MONOCYTES RELATIVE PERCENT: 9 % (ref 0–4)
NUCLEATED RBC %: 0 %
PDW BLD-RTO: 16.7 % (ref 11.7–14.9)
PLATELET # BLD: 361 K/CU MM (ref 140–440)
PMV BLD AUTO: 9.4 FL (ref 7.5–11.1)
POTASSIUM SERPL-SCNC: 3.9 MMOL/L (ref 3.5–5.1)
RBC # BLD: 3.36 M/CU MM (ref 4.2–5.4)
SEGMENTED NEUTROPHILS ABSOLUTE COUNT: 6.5 K/CU MM
SEGMENTED NEUTROPHILS RELATIVE PERCENT: 82.8 % (ref 36–66)
SODIUM BLD-SCNC: 142 MMOL/L (ref 135–145)
TOTAL IMMATURE NEUTOROPHIL: 0.02 K/CU MM
TOTAL NUCLEATED RBC: 0 K/CU MM
TOTAL PROTEIN: 4.5 GM/DL (ref 6.4–8.2)
WBC # BLD: 7.8 K/CU MM (ref 4–10.5)

## 2023-06-13 PROCEDURE — 92526 ORAL FUNCTION THERAPY: CPT

## 2023-06-13 PROCEDURE — 6360000002 HC RX W HCPCS: Performed by: HOSPITALIST

## 2023-06-13 PROCEDURE — 94761 N-INVAS EAR/PLS OXIMETRY MLT: CPT

## 2023-06-13 PROCEDURE — 2580000003 HC RX 258

## 2023-06-13 PROCEDURE — 92523 SPEECH SOUND LANG COMPREHEN: CPT

## 2023-06-13 PROCEDURE — 2580000003 HC RX 258: Performed by: FAMILY MEDICINE

## 2023-06-13 PROCEDURE — 83735 ASSAY OF MAGNESIUM: CPT

## 2023-06-13 PROCEDURE — 2580000003 HC RX 258: Performed by: HOSPITALIST

## 2023-06-13 PROCEDURE — 97530 THERAPEUTIC ACTIVITIES: CPT

## 2023-06-13 PROCEDURE — 74018 RADEX ABDOMEN 1 VIEW: CPT

## 2023-06-13 PROCEDURE — 6360000002 HC RX W HCPCS: Performed by: FAMILY MEDICINE

## 2023-06-13 PROCEDURE — 85025 COMPLETE CBC W/AUTO DIFF WBC: CPT

## 2023-06-13 PROCEDURE — 6360000002 HC RX W HCPCS: Performed by: INTERNAL MEDICINE

## 2023-06-13 PROCEDURE — 2500000003 HC RX 250 WO HCPCS: Performed by: INTERNAL MEDICINE

## 2023-06-13 PROCEDURE — 1200000000 HC SEMI PRIVATE

## 2023-06-13 PROCEDURE — 80053 COMPREHEN METABOLIC PANEL: CPT

## 2023-06-13 PROCEDURE — 6360000002 HC RX W HCPCS: Performed by: NURSE PRACTITIONER

## 2023-06-13 PROCEDURE — 97535 SELF CARE MNGMENT TRAINING: CPT

## 2023-06-13 PROCEDURE — 97166 OT EVAL MOD COMPLEX 45 MIN: CPT

## 2023-06-13 PROCEDURE — A4216 STERILE WATER/SALINE, 10 ML: HCPCS

## 2023-06-13 PROCEDURE — C9113 INJ PANTOPRAZOLE SODIUM, VIA: HCPCS | Performed by: HOSPITALIST

## 2023-06-13 PROCEDURE — APPSS30 APP SPLIT SHARED TIME 16-30 MINUTES

## 2023-06-13 PROCEDURE — 97164 PT RE-EVAL EST PLAN CARE: CPT

## 2023-06-13 PROCEDURE — 99233 SBSQ HOSP IP/OBS HIGH 50: CPT | Performed by: INTERNAL MEDICINE

## 2023-06-13 PROCEDURE — 36415 COLL VENOUS BLD VENIPUNCTURE: CPT

## 2023-06-13 RX ORDER — SODIUM CHLORIDE 9 MG/ML
INJECTION INTRAVENOUS
Status: COMPLETED
Start: 2023-06-13 | End: 2023-06-13

## 2023-06-13 RX ORDER — LABETALOL HYDROCHLORIDE 5 MG/ML
10 INJECTION, SOLUTION INTRAVENOUS EVERY 4 HOURS PRN
Status: DISCONTINUED | OUTPATIENT
Start: 2023-06-13 | End: 2023-06-16 | Stop reason: HOSPADM

## 2023-06-13 RX ORDER — HYDRALAZINE HYDROCHLORIDE 20 MG/ML
10 INJECTION INTRAMUSCULAR; INTRAVENOUS EVERY 4 HOURS PRN
Status: DISCONTINUED | OUTPATIENT
Start: 2023-06-13 | End: 2023-06-16 | Stop reason: HOSPADM

## 2023-06-13 RX ADMIN — HYDROMORPHONE HYDROCHLORIDE 0.25 MG: 1 INJECTION, SOLUTION INTRAMUSCULAR; INTRAVENOUS; SUBCUTANEOUS at 03:40

## 2023-06-13 RX ADMIN — HYDROMORPHONE HYDROCHLORIDE 0.25 MG: 1 INJECTION, SOLUTION INTRAMUSCULAR; INTRAVENOUS; SUBCUTANEOUS at 16:20

## 2023-06-13 RX ADMIN — DEXTROSE AND SODIUM CHLORIDE: 5; 900 INJECTION, SOLUTION INTRAVENOUS at 22:22

## 2023-06-13 RX ADMIN — ENOXAPARIN SODIUM 40 MG: 100 INJECTION SUBCUTANEOUS at 09:19

## 2023-06-13 RX ADMIN — PANTOPRAZOLE SODIUM 40 MG: 40 INJECTION, POWDER, FOR SOLUTION INTRAVENOUS at 20:35

## 2023-06-13 RX ADMIN — SODIUM CHLORIDE: 9 INJECTION, SOLUTION INTRAMUSCULAR; INTRAVENOUS; SUBCUTANEOUS at 20:35

## 2023-06-13 RX ADMIN — PANTOPRAZOLE SODIUM 40 MG: 40 INJECTION, POWDER, FOR SOLUTION INTRAVENOUS at 09:19

## 2023-06-13 RX ADMIN — HYDROMORPHONE HYDROCHLORIDE 0.25 MG: 1 INJECTION, SOLUTION INTRAMUSCULAR; INTRAVENOUS; SUBCUTANEOUS at 12:27

## 2023-06-13 RX ADMIN — CEFEPIME 2000 MG: 2 INJECTION, POWDER, FOR SOLUTION INTRAVENOUS at 04:53

## 2023-06-13 RX ADMIN — CEFEPIME 2000 MG: 2 INJECTION, POWDER, FOR SOLUTION INTRAVENOUS at 16:26

## 2023-06-13 RX ADMIN — HYDROMORPHONE HYDROCHLORIDE 0.25 MG: 1 INJECTION, SOLUTION INTRAMUSCULAR; INTRAVENOUS; SUBCUTANEOUS at 22:24

## 2023-06-13 RX ADMIN — FUROSEMIDE 40 MG: 10 INJECTION, SOLUTION INTRAMUSCULAR; INTRAVENOUS at 09:18

## 2023-06-13 RX ADMIN — LABETALOL HYDROCHLORIDE 10 MG: 5 INJECTION, SOLUTION INTRAVENOUS at 20:35

## 2023-06-13 RX ADMIN — VANCOMYCIN HYDROCHLORIDE 1000 MG: 1 INJECTION, POWDER, LYOPHILIZED, FOR SOLUTION INTRAVENOUS at 05:23

## 2023-06-13 ASSESSMENT — PAIN SCALES - GENERAL
PAINLEVEL_OUTOF10: 6
PAINLEVEL_OUTOF10: 9
PAINLEVEL_OUTOF10: 3
PAINLEVEL_OUTOF10: 3
PAINLEVEL_OUTOF10: 8

## 2023-06-13 ASSESSMENT — PAIN DESCRIPTION - ONSET: ONSET: ON-GOING

## 2023-06-13 ASSESSMENT — PAIN DESCRIPTION - ORIENTATION: ORIENTATION: LEFT

## 2023-06-13 ASSESSMENT — PAIN DESCRIPTION - DESCRIPTORS: DESCRIPTORS: ACHING

## 2023-06-13 ASSESSMENT — PAIN SCALES - WONG BAKER: WONGBAKER_NUMERICALRESPONSE: 6

## 2023-06-13 ASSESSMENT — PAIN DESCRIPTION - LOCATION
LOCATION: ABDOMEN
LOCATION: OTHER (COMMENT)

## 2023-06-13 ASSESSMENT — PAIN DESCRIPTION - PAIN TYPE: TYPE: ACUTE PAIN

## 2023-06-13 ASSESSMENT — PAIN DESCRIPTION - FREQUENCY: FREQUENCY: CONTINUOUS

## 2023-06-13 ASSESSMENT — PAIN - FUNCTIONAL ASSESSMENT: PAIN_FUNCTIONAL_ASSESSMENT: PREVENTS OR INTERFERES SOME ACTIVE ACTIVITIES AND ADLS

## 2023-06-13 NOTE — CARE COORDINATION
LSW read OT evals and they are recommending. ARU. Pt saw pt a few days ago and their recs stated TBD. LSW has placed a WB note asking for updated notes with recommendations. Speech is also following. Pt.  LSW spoke with pt's family regarding ARU and they are all agreeable. LSW made referral to [facility]. She will look over pt info and wait for PT updated notes recs CM following.

## 2023-06-13 NOTE — CONSULTS
[address], [address] [facility]                                  CONSULTATION    PATIENT NAME: Yisel Gagnon                   :        1934  MED REC NO:   [id number]                          ROOM:       3012  ACCOUNT NO:   [de-identified]                           ADMIT DATE: 06/10/2023  PROVIDER:     Pao Stoll MD    CONSULT DATE:  2023    CHIEF COMPLAINT:  History of recent CVA. HISTORY OF PRESENT ILLNESS:  As follows: The patient is an 27-year-old  white female with past medical history significant for bleeding peptic  ulcer disease status post surgery with partial gastrectomy and possibly  gastrojejunostomy in , hypertension, atrial fibrillation, chronic  kidney disease, osteoarthritis, who was admitted to the hospital on  06/10/2023 with CVA. The patient is aphasic at present and is unable to  give history. According to the patient's daughter there is no history  of abdominal pain, nausea, vomiting, hematemesis, melena or  hematochezia. The patient recently was [facility] [place] [place]  [place] for anemia and possibly GI bleeding and she underwent an EGD  by Dr. Malini Paul and was noted to have a clean based anastomotic ulcer at  the gastrojejunostomy site. There was no clot noted and also there was  no evidence of blood in the upper GI tract. According to the patient's  daughter, the patient has had multiple EGDs done in [place] and also had  a colonoscopy done in the remote past as well. The patient is  hemodynamically stable and is on Protonix 40 mg b.i.d. Speech and  Language Pathology evaluation is in order since the patient's oral  intake is nil at present. REVIEW OF SYSTEMS:  Cannot be assessed since the patient is aphasic.     PAST MEDICAL HISTORY:  Significant for history of hypertension, history  of bleeding peptic ulcer disease status post surgery in  with  partial gastrectomy and

## 2023-06-13 NOTE — CONSULTS
[facility] PHYSICAL THERAPY RE-EVALUATION  Sienna Norris, 9/12/1934, 3012/3012-A, 6/13/2023    History  [place]:  The primary encounter diagnosis was Altered mental status, unspecified altered mental status type. Diagnoses of Anemia, unspecified type, Elevated troponin, and Aphasia were also pertinent to this visit. Patient  has no past medical history on file. Patient  has no past surgical history on file. Subjective:    Patient states:  pt nods yes/no, unsure of accuracy. Pain:  pt nods no to pain. Communication with other providers:  Handoff to RN    Restrictions: general precautions, fall risk    Home Setup/Prior level of function  Social/Functional History  Lives With: Son, Family  Type of Home: House  Home Layout: One level  Home Access: Stairs to enter with rails  Entrance Stairs - Number of Steps: 2  Entrance Stairs - Rails: Left  Bathroom Shower/Tub: Tub/Shower unit  Bathroom Toilet: Standard  Bathroom Equipment: Shower chair  Bathroom Accessibility: Accessible  Home Equipment: Leatha Schilder, rolling, Cane (last 2 weeks transitioned to cane/walker)  Has the patient had two or more falls in the past year or any fall with injury in the past year?: No  ADL Assistance: Independent  Homemaking Assistance: Needs assistance  Ambulation Assistance: Independent  Transfer Assistance: Independent  Active : Yes  Occupation: Retired    Examination of body systems (includes body structures/functions, activity/participation limitations):  Observation:  pt supine in bed with son present upon arrival and agreeable to therapy  Vision:  R eye appears impaired  Hearing:  [place] per son  Cardiopulmonary:  no O2 needs  Cognition: unable to formally test due to pt with inconsistent yes/no this date, lethargic, follows majority of one step commands, see OT/SLP note for further evaluation. Musculoskeletal  ROM R/L:  WFL.     Strength R LE 2/5, L LE 4/5 significant impairment in function and

## 2023-06-14 LAB
ALBUMIN SERPL-MCNC: 2.6 GM/DL (ref 3.4–5)
ANION GAP SERPL CALCULATED.3IONS-SCNC: 13 MMOL/L (ref 4–16)
ANION GAP SERPL CALCULATED.3IONS-SCNC: 13 MMOL/L (ref 4–16)
BUN SERPL-MCNC: 15 MG/DL (ref 6–23)
BUN SERPL-MCNC: 17 MG/DL (ref 6–23)
CALCIUM SERPL-MCNC: 8 MG/DL (ref 8.3–10.6)
CALCIUM SERPL-MCNC: 8.2 MG/DL (ref 8.3–10.6)
CHLORIDE BLD-SCNC: 101 MMOL/L (ref 99–110)
CHLORIDE BLD-SCNC: 105 MMOL/L (ref 99–110)
CO2: 26 MMOL/L (ref 21–32)
CO2: 28 MMOL/L (ref 21–32)
CREAT SERPL-MCNC: 0.7 MG/DL (ref 0.6–1.1)
CREAT SERPL-MCNC: 0.8 MG/DL (ref 0.6–1.1)
DOSE AMOUNT: ABNORMAL
DOSE TIME: ABNORMAL
GFR SERPL CREATININE-BSD FRML MDRD: >60 ML/MIN/1.73M2
GFR SERPL CREATININE-BSD FRML MDRD: >60 ML/MIN/1.73M2
GLUCOSE SERPL-MCNC: 154 MG/DL (ref 70–99)
GLUCOSE SERPL-MCNC: 156 MG/DL (ref 70–99)
MAGNESIUM: 1.9 MG/DL (ref 1.8–2.4)
MAGNESIUM: 2.1 MG/DL (ref 1.8–2.4)
PHOSPHORUS: 3.4 MG/DL (ref 2.5–4.9)
PHOSPHORUS: 3.6 MG/DL (ref 2.5–4.9)
POTASSIUM SERPL-SCNC: 3.4 MMOL/L (ref 3.5–5.1)
POTASSIUM SERPL-SCNC: 3.5 MMOL/L (ref 3.5–5.1)
SODIUM BLD-SCNC: 142 MMOL/L (ref 135–145)
SODIUM BLD-SCNC: 144 MMOL/L (ref 135–145)
VANCOMYCIN TROUGH: 9.1 UG/ML (ref 10–20)

## 2023-06-14 PROCEDURE — 36415 COLL VENOUS BLD VENIPUNCTURE: CPT

## 2023-06-14 PROCEDURE — C9113 INJ PANTOPRAZOLE SODIUM, VIA: HCPCS | Performed by: HOSPITALIST

## 2023-06-14 PROCEDURE — 6370000000 HC RX 637 (ALT 250 FOR IP): Performed by: INTERNAL MEDICINE

## 2023-06-14 PROCEDURE — 2580000003 HC RX 258

## 2023-06-14 PROCEDURE — 97530 THERAPEUTIC ACTIVITIES: CPT

## 2023-06-14 PROCEDURE — 6360000002 HC RX W HCPCS: Performed by: INTERNAL MEDICINE

## 2023-06-14 PROCEDURE — 51702 INSERT TEMP BLADDER CATH: CPT

## 2023-06-14 PROCEDURE — 84100 ASSAY OF PHOSPHORUS: CPT

## 2023-06-14 PROCEDURE — 99232 SBSQ HOSP IP/OBS MODERATE 35: CPT | Performed by: NURSE PRACTITIONER

## 2023-06-14 PROCEDURE — 92526 ORAL FUNCTION THERAPY: CPT

## 2023-06-14 PROCEDURE — 6370000000 HC RX 637 (ALT 250 FOR IP)

## 2023-06-14 PROCEDURE — 83735 ASSAY OF MAGNESIUM: CPT

## 2023-06-14 PROCEDURE — 97535 SELF CARE MNGMENT TRAINING: CPT

## 2023-06-14 PROCEDURE — 6360000002 HC RX W HCPCS: Performed by: FAMILY MEDICINE

## 2023-06-14 PROCEDURE — 94761 N-INVAS EAR/PLS OXIMETRY MLT: CPT

## 2023-06-14 PROCEDURE — 2500000003 HC RX 250 WO HCPCS: Performed by: INTERNAL MEDICINE

## 2023-06-14 PROCEDURE — 80048 BASIC METABOLIC PNL TOTAL CA: CPT

## 2023-06-14 PROCEDURE — 1200000000 HC SEMI PRIVATE

## 2023-06-14 PROCEDURE — 80202 ASSAY OF VANCOMYCIN: CPT

## 2023-06-14 PROCEDURE — 80069 RENAL FUNCTION PANEL: CPT

## 2023-06-14 PROCEDURE — 2580000003 HC RX 258: Performed by: FAMILY MEDICINE

## 2023-06-14 PROCEDURE — 6360000002 HC RX W HCPCS: Performed by: HOSPITALIST

## 2023-06-14 PROCEDURE — 92507 TX SP LANG VOICE COMM INDIV: CPT

## 2023-06-14 PROCEDURE — 99232 SBSQ HOSP IP/OBS MODERATE 35: CPT | Performed by: INTERNAL MEDICINE

## 2023-06-14 RX ORDER — AMLODIPINE BESYLATE 5 MG/1
5 TABLET ORAL DAILY
Status: DISCONTINUED | OUTPATIENT
Start: 2023-06-14 | End: 2023-06-16 | Stop reason: HOSPADM

## 2023-06-14 RX ORDER — AMOXICILLIN AND CLAVULANATE POTASSIUM 875; 125 MG/1; MG/1
1 TABLET, FILM COATED ORAL EVERY 12 HOURS SCHEDULED
Status: DISCONTINUED | OUTPATIENT
Start: 2023-06-14 | End: 2023-06-16 | Stop reason: HOSPADM

## 2023-06-14 RX ORDER — WATER 1000 ML/1000ML
INJECTION, SOLUTION INTRAVENOUS
Status: COMPLETED
Start: 2023-06-14 | End: 2023-06-14

## 2023-06-14 RX ORDER — FUROSEMIDE 40 MG/1
40 TABLET ORAL DAILY
Status: DISCONTINUED | OUTPATIENT
Start: 2023-06-14 | End: 2023-06-16 | Stop reason: HOSPADM

## 2023-06-14 RX ADMIN — HYDRALAZINE HYDROCHLORIDE 10 MG: 20 INJECTION INTRAMUSCULAR; INTRAVENOUS at 07:04

## 2023-06-14 RX ADMIN — FUROSEMIDE 40 MG: 10 INJECTION, SOLUTION INTRAMUSCULAR; INTRAVENOUS at 08:25

## 2023-06-14 RX ADMIN — HYDROMORPHONE HYDROCHLORIDE 0.25 MG: 1 INJECTION, SOLUTION INTRAMUSCULAR; INTRAVENOUS; SUBCUTANEOUS at 10:07

## 2023-06-14 RX ADMIN — CEFEPIME 2000 MG: 2 INJECTION, POWDER, FOR SOLUTION INTRAVENOUS at 04:55

## 2023-06-14 RX ADMIN — LABETALOL HYDROCHLORIDE 10 MG: 5 INJECTION, SOLUTION INTRAVENOUS at 04:46

## 2023-06-14 RX ADMIN — ATORVASTATIN CALCIUM 40 MG: 40 TABLET, FILM COATED ORAL at 21:03

## 2023-06-14 RX ADMIN — ONDANSETRON 4 MG: 2 INJECTION INTRAMUSCULAR; INTRAVENOUS at 10:07

## 2023-06-14 RX ADMIN — PANTOPRAZOLE SODIUM 40 MG: 40 INJECTION, POWDER, FOR SOLUTION INTRAVENOUS at 21:03

## 2023-06-14 RX ADMIN — HYDROMORPHONE HYDROCHLORIDE 0.25 MG: 1 INJECTION, SOLUTION INTRAMUSCULAR; INTRAVENOUS; SUBCUTANEOUS at 20:58

## 2023-06-14 RX ADMIN — FUROSEMIDE 40 MG: 40 TABLET ORAL at 16:46

## 2023-06-14 RX ADMIN — ENOXAPARIN SODIUM 40 MG: 100 INJECTION SUBCUTANEOUS at 08:26

## 2023-06-14 RX ADMIN — VANCOMYCIN HYDROCHLORIDE 1000 MG: 1 INJECTION, POWDER, LYOPHILIZED, FOR SOLUTION INTRAVENOUS at 08:40

## 2023-06-14 RX ADMIN — AMLODIPINE BESYLATE 5 MG: 5 TABLET ORAL at 15:43

## 2023-06-14 RX ADMIN — PANTOPRAZOLE SODIUM 40 MG: 40 INJECTION, POWDER, FOR SOLUTION INTRAVENOUS at 08:25

## 2023-06-14 RX ADMIN — AMOXICILLIN AND CLAVULANATE POTASSIUM 1 TABLET: 875; 125 TABLET, FILM COATED ORAL at 16:46

## 2023-06-14 RX ADMIN — WATER 10 ML: 1 INJECTION INTRAMUSCULAR; INTRAVENOUS; SUBCUTANEOUS at 21:03

## 2023-06-14 RX ADMIN — HYDROMORPHONE HYDROCHLORIDE 0.25 MG: 1 INJECTION, SOLUTION INTRAMUSCULAR; INTRAVENOUS; SUBCUTANEOUS at 15:47

## 2023-06-14 ASSESSMENT — PAIN SCALES - GENERAL: PAINLEVEL_OUTOF10: 8

## 2023-06-14 NOTE — PLAN OF CARE
Problem: Discharge Planning  Goal: Discharge to home or other facility with appropriate resources  6/14/2023 1421 by Dana Davis RN  Outcome: Progressing  6/14/2023 0203 by Yuki Bermeo RN  Outcome: Progressing     Problem: Skin/Tissue Integrity  Goal: Absence of new skin breakdown  Description: 1. Monitor for areas of redness and/or skin breakdown  2. Assess vascular access sites hourly  3. Every 4-6 hours minimum:  Change oxygen saturation probe site  4. Every 4-6 hours:  If on nasal continuous positive airway pressure, respiratory therapy assess nares and determine need for appliance change or resting period. 6/14/2023 1421 by Dana Davis RN  Outcome: Progressing  6/14/2023 0203 by Yuki Bermeo RN  Outcome: Progressing     Problem: Safety - Adult  Goal: Free from fall injury  6/14/2023 1421 by Dana Davis RN  Outcome: Progressing  6/14/2023 0203 by Yuki Bermeo RN  Outcome: Progressing     Problem: ABCDS Injury Assessment  Goal: Absence of physical injury  6/14/2023 1421 by Dana Davis RN  Outcome: Progressing  6/14/2023 0203 by Yuki Bermeo RN  Outcome: Progressing     Problem: Confusion  Goal: Confusion, delirium, dementia, or psychosis is improved or at baseline  Description: INTERVENTIONS:  1. Assess for possible contributors to thought disturbance, including medications, impaired vision or hearing, underlying metabolic abnormalities, dehydration, psychiatric diagnoses, and notify attending LIP  2. Lane high risk fall precautions, as indicated  3. Provide frequent short contacts to provide reality reorientation, refocusing and direction  4. Decrease environmental stimuli, including noise as appropriate  5. Monitor and intervene to maintain adequate nutrition, hydration, elimination, sleep and activity  6. If unable to ensure safety without constant attention obtain sitter and review sitter guidelines with assigned personnel  7.  Initiate Psychosocial CNS and Spiritual Care

## 2023-06-14 NOTE — CONSULTS
Comprehensive Nutrition Assessment    Type and Reason for Visit:  Initial, Consult (TF order and mgt)    Nutrition Recommendations/Plan:   Diet advancement per speech   EN recommendation: Standard without Fiber @ 40 ml/hr goal rate. Start at trickle feeds and advance slowly. Will monitor fluids for FWF once able to tolerate EN rate; continue IVF delivery      Malnutrition Assessment:  Malnutrition Status:  Severe malnutrition (06/14/23 1026)    Context:  Chronic Illness     Findings of the 6 clinical characteristics of malnutrition:  Energy Intake:  75% or less estimated energy requirements for 1 month or longer (hx pureed diet)  Weight Loss:  No significant weight loss (per family report)     Body Fat Loss:   (Moderate fat loss) Orbital, Buccal region, Triceps   Muscle Mass Loss:   (Moderate wasting) Temples (temporalis), Scapula (trapezius) (No wasting in LE)  Fluid Accumulation:  Unable to assess     Strength:  Not Performed    Nutrition Assessment:    Admitted with change in mental status. Hx gastric bypass, CVA, PAF, HTN, HF, Aphasia. Pt currently NPO s/s mental status. Family present at visit, reports pt was on a pureed diet/thins over years. Poor intakes PTA. Able to consume Ensure, but does not drink much dairy in general. Currently started on 15 ml/hr of Jevity 1.5 formula, was not tolerating. Will switch to Osmolite 1.2 to trial. Discuss with family if pt does not tolerate due to hx of gastric issues, may benefit from J tube vs. G tube for better tolerance, only disadvantage of J tube if pt decides to go to ARU, J tube must be running tube feeds continuously. Follow as high nutrition risk. Nutrition Related Findings:    Family reports pt denies weight loss. Pt is upset that she does not lose wt, when I told pt that we don't want her to lose wt during her stay, she said \"Ok. \" Able to respond slowly. Sitting up in bed. Performed NFPE, moderate wasting noted. Meets criteria for malnutrition.

## 2023-06-14 NOTE — PLAN OF CARE
Problem: Discharge Planning  Goal: Discharge to home or other facility with appropriate resources  Outcome: Progressing     Problem: Skin/Tissue Integrity  Goal: Absence of new skin breakdown  Description: 1. Monitor for areas of redness and/or skin breakdown  2. Assess vascular access sites hourly  3. Every 4-6 hours minimum:  Change oxygen saturation probe site  4. Every 4-6 hours:  If on nasal continuous positive airway pressure, respiratory therapy assess nares and determine need for appliance change or resting period. Outcome: Progressing     Problem: Safety - Adult  Goal: Free from fall injury  Outcome: Progressing     Problem: ABCDS Injury Assessment  Goal: Absence of physical injury  Outcome: Progressing     Problem: Confusion  Goal: Confusion, delirium, dementia, or psychosis is improved or at baseline  Description: INTERVENTIONS:  1. Assess for possible contributors to thought disturbance, including medications, impaired vision or hearing, underlying metabolic abnormalities, dehydration, psychiatric diagnoses, and notify attending LIP  2. [place] high risk fall precautions, as indicated  3. Provide frequent short contacts to provide reality reorientation, refocusing and direction  4. Decrease environmental stimuli, including noise as appropriate  5. Monitor and intervene to maintain adequate nutrition, hydration, elimination, sleep and activity  6. If unable to ensure safety without constant attention obtain sitter and review sitter guidelines with assigned personnel  7.  Initiate Psychosocial CNS and Spiritual Care consult, as indicated  Outcome: Progressing     Problem: Chronic Conditions and Co-morbidities  Goal: Patient's chronic conditions and co-morbidity symptoms are monitored and maintained or improved  Outcome: Progressing     Problem: Pain  Goal: Verbalizes/displays adequate comfort level or baseline comfort level  Outcome: Progressing

## 2023-06-14 NOTE — CARE COORDINATION
PT markus noted. Will present to Dr. Juju Swartz and initiate ARU pre-cert with [facility]. 4242:  ARU pre-cert pending with [employer] at this time. Will follow for determination. Pending   Ref# [id number].

## 2023-06-15 ENCOUNTER — APPOINTMENT (OUTPATIENT)
Dept: GENERAL RADIOLOGY | Age: 88
DRG: 064 | End: 2023-06-15
Payer: MEDICARE

## 2023-06-15 ENCOUNTER — APPOINTMENT (OUTPATIENT)
Dept: GENERAL RADIOLOGY | Age: 88
DRG: 064 | End: 2023-06-15
Attending: INTERNAL MEDICINE
Payer: MEDICARE

## 2023-06-15 LAB
ANION GAP SERPL CALCULATED.3IONS-SCNC: 13 MMOL/L (ref 4–16)
BUN SERPL-MCNC: 17 MG/DL (ref 6–23)
CALCIUM SERPL-MCNC: 8.2 MG/DL (ref 8.3–10.6)
CHLORIDE BLD-SCNC: 101 MMOL/L (ref 99–110)
CO2: 27 MMOL/L (ref 21–32)
CREAT SERPL-MCNC: 0.8 MG/DL (ref 0.6–1.1)
GFR SERPL CREATININE-BSD FRML MDRD: >60 ML/MIN/1.73M2
GLUCOSE SERPL-MCNC: 138 MG/DL (ref 70–99)
MAGNESIUM: 2 MG/DL (ref 1.8–2.4)
PHOSPHORUS: 3.5 MG/DL (ref 2.5–4.9)
POTASSIUM SERPL-SCNC: 3.3 MMOL/L (ref 3.5–5.1)
SODIUM BLD-SCNC: 141 MMOL/L (ref 135–145)

## 2023-06-15 PROCEDURE — 6360000002 HC RX W HCPCS: Performed by: INTERNAL MEDICINE

## 2023-06-15 PROCEDURE — 83735 ASSAY OF MAGNESIUM: CPT

## 2023-06-15 PROCEDURE — 6370000000 HC RX 637 (ALT 250 FOR IP): Performed by: INTERNAL MEDICINE

## 2023-06-15 PROCEDURE — 74018 RADEX ABDOMEN 1 VIEW: CPT

## 2023-06-15 PROCEDURE — 92526 ORAL FUNCTION THERAPY: CPT

## 2023-06-15 PROCEDURE — 97530 THERAPEUTIC ACTIVITIES: CPT

## 2023-06-15 PROCEDURE — 2580000003 HC RX 258

## 2023-06-15 PROCEDURE — 97112 NEUROMUSCULAR REEDUCATION: CPT

## 2023-06-15 PROCEDURE — 92507 TX SP LANG VOICE COMM INDIV: CPT

## 2023-06-15 PROCEDURE — 36415 COLL VENOUS BLD VENIPUNCTURE: CPT

## 2023-06-15 PROCEDURE — 6370000000 HC RX 637 (ALT 250 FOR IP)

## 2023-06-15 PROCEDURE — 2500000003 HC RX 250 WO HCPCS: Performed by: INTERNAL MEDICINE

## 2023-06-15 PROCEDURE — 6360000002 HC RX W HCPCS: Performed by: HOSPITALIST

## 2023-06-15 PROCEDURE — 1200000000 HC SEMI PRIVATE

## 2023-06-15 PROCEDURE — C9113 INJ PANTOPRAZOLE SODIUM, VIA: HCPCS | Performed by: HOSPITALIST

## 2023-06-15 PROCEDURE — 84100 ASSAY OF PHOSPHORUS: CPT

## 2023-06-15 PROCEDURE — 80048 BASIC METABOLIC PNL TOTAL CA: CPT

## 2023-06-15 RX ORDER — ASPIRIN 81 MG/1
81 TABLET, CHEWABLE ORAL DAILY
Status: DISCONTINUED | OUTPATIENT
Start: 2023-06-15 | End: 2023-06-16 | Stop reason: HOSPADM

## 2023-06-15 RX ORDER — WATER 1000 ML/1000ML
INJECTION, SOLUTION INTRAVENOUS
Status: COMPLETED
Start: 2023-06-15 | End: 2023-06-15

## 2023-06-15 RX ADMIN — HYDROMORPHONE HYDROCHLORIDE 0.25 MG: 1 INJECTION, SOLUTION INTRAMUSCULAR; INTRAVENOUS; SUBCUTANEOUS at 15:52

## 2023-06-15 RX ADMIN — HYDROMORPHONE HYDROCHLORIDE 0.25 MG: 1 INJECTION, SOLUTION INTRAMUSCULAR; INTRAVENOUS; SUBCUTANEOUS at 09:15

## 2023-06-15 RX ADMIN — APIXABAN 5 MG: 5 TABLET, FILM COATED ORAL at 09:15

## 2023-06-15 RX ADMIN — AMOXICILLIN AND CLAVULANATE POTASSIUM 1 TABLET: 875; 125 TABLET, FILM COATED ORAL at 21:55

## 2023-06-15 RX ADMIN — HYDROMORPHONE HYDROCHLORIDE 0.25 MG: 1 INJECTION, SOLUTION INTRAMUSCULAR; INTRAVENOUS; SUBCUTANEOUS at 04:59

## 2023-06-15 RX ADMIN — APIXABAN 5 MG: 5 TABLET, FILM COATED ORAL at 21:56

## 2023-06-15 RX ADMIN — WATER 10 ML: 1 INJECTION INTRAMUSCULAR; INTRAVENOUS; SUBCUTANEOUS at 21:56

## 2023-06-15 RX ADMIN — FUROSEMIDE 40 MG: 40 TABLET ORAL at 09:15

## 2023-06-15 RX ADMIN — HYDROMORPHONE HYDROCHLORIDE 0.25 MG: 1 INJECTION, SOLUTION INTRAMUSCULAR; INTRAVENOUS; SUBCUTANEOUS at 21:00

## 2023-06-15 RX ADMIN — AMOXICILLIN AND CLAVULANATE POTASSIUM 1 TABLET: 875; 125 TABLET, FILM COATED ORAL at 09:15

## 2023-06-15 RX ADMIN — PANTOPRAZOLE SODIUM 40 MG: 40 INJECTION, POWDER, FOR SOLUTION INTRAVENOUS at 09:15

## 2023-06-15 RX ADMIN — PANTOPRAZOLE SODIUM 40 MG: 40 INJECTION, POWDER, FOR SOLUTION INTRAVENOUS at 21:56

## 2023-06-15 RX ADMIN — LABETALOL HYDROCHLORIDE 10 MG: 5 INJECTION, SOLUTION INTRAVENOUS at 22:23

## 2023-06-15 RX ADMIN — ATORVASTATIN CALCIUM 40 MG: 40 TABLET, FILM COATED ORAL at 21:56

## 2023-06-15 RX ADMIN — POTASSIUM BICARBONATE 40 MEQ: 782 TABLET, EFFERVESCENT ORAL at 09:15

## 2023-06-15 RX ADMIN — ASPIRIN 81 MG: 81 TABLET, CHEWABLE ORAL at 09:15

## 2023-06-15 RX ADMIN — AMLODIPINE BESYLATE 5 MG: 5 TABLET ORAL at 09:15

## 2023-06-15 ASSESSMENT — PAIN SCALES - GENERAL
PAINLEVEL_OUTOF10: 7
PAINLEVEL_OUTOF10: 0
PAINLEVEL_OUTOF10: 7

## 2023-06-15 ASSESSMENT — PAIN SCALES - WONG BAKER
WONGBAKER_NUMERICALRESPONSE: 0

## 2023-06-16 ENCOUNTER — HOSPITAL ENCOUNTER (INPATIENT)
Age: 88
DRG: 056 | End: 2023-06-16
Attending: PHYSICAL MEDICINE & REHABILITATION | Admitting: PHYSICAL MEDICINE & REHABILITATION
Payer: MEDICARE

## 2023-06-16 VITALS
WEIGHT: 124.2 LBS | BODY MASS INDEX: 24.39 KG/M2 | TEMPERATURE: 98 F | RESPIRATION RATE: 16 BRPM | OXYGEN SATURATION: 100 % | HEIGHT: 60 IN | SYSTOLIC BLOOD PRESSURE: 178 MMHG | DIASTOLIC BLOOD PRESSURE: 73 MMHG | HEART RATE: 68 BPM

## 2023-06-16 LAB
ANION GAP SERPL CALCULATED.3IONS-SCNC: 14 MMOL/L (ref 4–16)
BUN SERPL-MCNC: 16 MG/DL (ref 6–23)
CALCIUM SERPL-MCNC: 8 MG/DL (ref 8.3–10.6)
CHLORIDE BLD-SCNC: 100 MMOL/L (ref 99–110)
CO2: 28 MMOL/L (ref 21–32)
CREAT SERPL-MCNC: 0.6 MG/DL (ref 0.6–1.1)
GFR SERPL CREATININE-BSD FRML MDRD: >60 ML/MIN/1.73M2
GLUCOSE SERPL-MCNC: 152 MG/DL (ref 70–99)
MAGNESIUM: 2 MG/DL (ref 1.8–2.4)
PHOSPHORUS: 2.9 MG/DL (ref 2.5–4.9)
POTASSIUM SERPL-SCNC: 3.1 MMOL/L (ref 3.5–5.1)
SODIUM BLD-SCNC: 142 MMOL/L (ref 135–145)

## 2023-06-16 PROCEDURE — 83735 ASSAY OF MAGNESIUM: CPT

## 2023-06-16 PROCEDURE — 99223 1ST HOSP IP/OBS HIGH 75: CPT | Performed by: PHYSICAL MEDICINE & REHABILITATION

## 2023-06-16 PROCEDURE — 6360000002 HC RX W HCPCS: Performed by: INTERNAL MEDICINE

## 2023-06-16 PROCEDURE — 94761 N-INVAS EAR/PLS OXIMETRY MLT: CPT

## 2023-06-16 PROCEDURE — 80048 BASIC METABOLIC PNL TOTAL CA: CPT

## 2023-06-16 PROCEDURE — 97535 SELF CARE MNGMENT TRAINING: CPT

## 2023-06-16 PROCEDURE — C9113 INJ PANTOPRAZOLE SODIUM, VIA: HCPCS | Performed by: INTERNAL MEDICINE

## 2023-06-16 PROCEDURE — 6360000002 HC RX W HCPCS: Performed by: HOSPITALIST

## 2023-06-16 PROCEDURE — 36415 COLL VENOUS BLD VENIPUNCTURE: CPT

## 2023-06-16 PROCEDURE — 6370000000 HC RX 637 (ALT 250 FOR IP): Performed by: INTERNAL MEDICINE

## 2023-06-16 PROCEDURE — 6370000000 HC RX 637 (ALT 250 FOR IP)

## 2023-06-16 PROCEDURE — 6370000000 HC RX 637 (ALT 250 FOR IP): Performed by: PHYSICAL MEDICINE & REHABILITATION

## 2023-06-16 PROCEDURE — C9113 INJ PANTOPRAZOLE SODIUM, VIA: HCPCS | Performed by: HOSPITALIST

## 2023-06-16 PROCEDURE — 1280000000 HC REHAB R&B

## 2023-06-16 PROCEDURE — 3E0G76Z INTRODUCTION OF NUTRITIONAL SUBSTANCE INTO UPPER GI, VIA NATURAL OR ARTIFICIAL OPENING: ICD-10-PCS | Performed by: INTERNAL MEDICINE

## 2023-06-16 PROCEDURE — 84100 ASSAY OF PHOSPHORUS: CPT

## 2023-06-16 RX ORDER — POLYETHYLENE GLYCOL 3350 17 G/17G
17 POWDER, FOR SOLUTION ORAL DAILY PRN
Status: DISCONTINUED | OUTPATIENT
Start: 2023-06-16 | End: 2023-06-29 | Stop reason: HOSPADM

## 2023-06-16 RX ORDER — ATORVASTATIN CALCIUM 40 MG/1
40 TABLET, FILM COATED ORAL NIGHTLY
Status: DISCONTINUED | OUTPATIENT
Start: 2023-06-16 | End: 2023-06-16

## 2023-06-16 RX ORDER — AMOXICILLIN AND CLAVULANATE POTASSIUM 875; 125 MG/1; MG/1
1 TABLET, FILM COATED ORAL EVERY 12 HOURS SCHEDULED
Status: DISCONTINUED | OUTPATIENT
Start: 2023-06-16 | End: 2023-06-19

## 2023-06-16 RX ORDER — HYDRALAZINE HYDROCHLORIDE 20 MG/ML
10 INJECTION INTRAMUSCULAR; INTRAVENOUS EVERY 4 HOURS PRN
Status: CANCELLED | OUTPATIENT
Start: 2023-06-16

## 2023-06-16 RX ORDER — BISACODYL 10 MG
10 SUPPOSITORY, RECTAL RECTAL DAILY PRN
Status: CANCELLED | OUTPATIENT
Start: 2023-06-16

## 2023-06-16 RX ORDER — FUROSEMIDE 40 MG/1
40 TABLET ORAL DAILY
Status: CANCELLED | OUTPATIENT
Start: 2023-06-17

## 2023-06-16 RX ORDER — AMLODIPINE BESYLATE 5 MG/1
5 TABLET ORAL DAILY
Status: DISCONTINUED | OUTPATIENT
Start: 2023-06-17 | End: 2023-06-29 | Stop reason: HOSPADM

## 2023-06-16 RX ORDER — AMOXICILLIN AND CLAVULANATE POTASSIUM 875; 125 MG/1; MG/1
1 TABLET, FILM COATED ORAL EVERY 12 HOURS SCHEDULED
Status: DISCONTINUED | OUTPATIENT
Start: 2023-06-16 | End: 2023-06-16

## 2023-06-16 RX ORDER — POLYETHYLENE GLYCOL 3350 17 G/17G
17 POWDER, FOR SOLUTION ORAL DAILY PRN
Status: CANCELLED | OUTPATIENT
Start: 2023-06-16

## 2023-06-16 RX ORDER — HYDRALAZINE HYDROCHLORIDE 20 MG/ML
10 INJECTION INTRAMUSCULAR; INTRAVENOUS EVERY 4 HOURS PRN
Status: DISCONTINUED | OUTPATIENT
Start: 2023-06-16 | End: 2023-06-16

## 2023-06-16 RX ORDER — LABETALOL HYDROCHLORIDE 5 MG/ML
10 INJECTION, SOLUTION INTRAVENOUS EVERY 4 HOURS PRN
Status: DISCONTINUED | OUTPATIENT
Start: 2023-06-16 | End: 2023-06-16

## 2023-06-16 RX ORDER — ACETAMINOPHEN 325 MG/1
650 TABLET ORAL EVERY 4 HOURS PRN
Status: DISCONTINUED | OUTPATIENT
Start: 2023-06-16 | End: 2023-06-16

## 2023-06-16 RX ORDER — ACETAMINOPHEN 325 MG/1
650 TABLET ORAL EVERY 4 HOURS PRN
Status: DISCONTINUED | OUTPATIENT
Start: 2023-06-16 | End: 2023-06-29 | Stop reason: HOSPADM

## 2023-06-16 RX ORDER — ONDANSETRON 2 MG/ML
4 INJECTION INTRAMUSCULAR; INTRAVENOUS EVERY 6 HOURS PRN
Status: CANCELLED | OUTPATIENT
Start: 2023-06-16

## 2023-06-16 RX ORDER — AMLODIPINE BESYLATE 5 MG/1
5 TABLET ORAL DAILY
Status: DISCONTINUED | OUTPATIENT
Start: 2023-06-17 | End: 2023-06-16

## 2023-06-16 RX ORDER — POTASSIUM CHLORIDE 20 MEQ/1
40 TABLET, EXTENDED RELEASE ORAL PRN
Status: DISCONTINUED | OUTPATIENT
Start: 2023-06-16 | End: 2023-06-16

## 2023-06-16 RX ORDER — AMLODIPINE BESYLATE 5 MG/1
5 TABLET ORAL DAILY
Status: CANCELLED | OUTPATIENT
Start: 2023-06-17

## 2023-06-16 RX ORDER — ATORVASTATIN CALCIUM 40 MG/1
40 TABLET, FILM COATED ORAL NIGHTLY
Status: CANCELLED | OUTPATIENT
Start: 2023-06-16

## 2023-06-16 RX ORDER — ONDANSETRON 4 MG/1
4 TABLET, ORALLY DISINTEGRATING ORAL EVERY 8 HOURS PRN
Status: DISCONTINUED | OUTPATIENT
Start: 2023-06-16 | End: 2023-06-16

## 2023-06-16 RX ORDER — ONDANSETRON 4 MG/1
4 TABLET, ORALLY DISINTEGRATING ORAL EVERY 8 HOURS PRN
Status: CANCELLED | OUTPATIENT
Start: 2023-06-16

## 2023-06-16 RX ORDER — LABETALOL HYDROCHLORIDE 5 MG/ML
10 INJECTION, SOLUTION INTRAVENOUS EVERY 4 HOURS PRN
Status: CANCELLED | OUTPATIENT
Start: 2023-06-16

## 2023-06-16 RX ORDER — ONDANSETRON 4 MG/1
4 TABLET, ORALLY DISINTEGRATING ORAL EVERY 6 HOURS PRN
Status: DISCONTINUED | OUTPATIENT
Start: 2023-06-16 | End: 2023-06-29 | Stop reason: HOSPADM

## 2023-06-16 RX ORDER — MAGNESIUM SULFATE IN WATER 40 MG/ML
2000 INJECTION, SOLUTION INTRAVENOUS PRN
Status: CANCELLED | OUTPATIENT
Start: 2023-06-16

## 2023-06-16 RX ORDER — FUROSEMIDE 40 MG/1
40 TABLET ORAL DAILY
Status: DISCONTINUED | OUTPATIENT
Start: 2023-06-17 | End: 2023-06-28

## 2023-06-16 RX ORDER — ACETAMINOPHEN 650 MG/1
650 SUPPOSITORY RECTAL EVERY 4 HOURS PRN
Status: DISCONTINUED | OUTPATIENT
Start: 2023-06-16 | End: 2023-06-16

## 2023-06-16 RX ORDER — MAGNESIUM SULFATE IN WATER 40 MG/ML
2000 INJECTION, SOLUTION INTRAVENOUS PRN
Status: DISCONTINUED | OUTPATIENT
Start: 2023-06-16 | End: 2023-06-16

## 2023-06-16 RX ORDER — ASPIRIN 81 MG/1
81 TABLET, CHEWABLE ORAL DAILY
Status: DISCONTINUED | OUTPATIENT
Start: 2023-06-17 | End: 2023-06-26

## 2023-06-16 RX ORDER — ATORVASTATIN CALCIUM 40 MG/1
40 TABLET, FILM COATED ORAL NIGHTLY
Status: DISCONTINUED | OUTPATIENT
Start: 2023-06-16 | End: 2023-06-29 | Stop reason: HOSPADM

## 2023-06-16 RX ORDER — POTASSIUM CHLORIDE 7.45 MG/ML
10 INJECTION INTRAVENOUS PRN
Status: DISCONTINUED | OUTPATIENT
Start: 2023-06-16 | End: 2023-06-16

## 2023-06-16 RX ORDER — ACETAMINOPHEN 325 MG/1
650 TABLET ORAL EVERY 4 HOURS PRN
Status: CANCELLED | OUTPATIENT
Start: 2023-06-16

## 2023-06-16 RX ORDER — ONDANSETRON 2 MG/ML
4 INJECTION INTRAMUSCULAR; INTRAVENOUS EVERY 6 HOURS PRN
Status: DISCONTINUED | OUTPATIENT
Start: 2023-06-16 | End: 2023-06-16

## 2023-06-16 RX ORDER — ACETAMINOPHEN 650 MG/1
650 SUPPOSITORY RECTAL EVERY 4 HOURS PRN
Status: CANCELLED | OUTPATIENT
Start: 2023-06-16

## 2023-06-16 RX ORDER — POLYETHYLENE GLYCOL 3350 17 G/17G
17 POWDER, FOR SOLUTION ORAL DAILY PRN
Status: DISCONTINUED | OUTPATIENT
Start: 2023-06-16 | End: 2023-06-16

## 2023-06-16 RX ORDER — ASPIRIN 81 MG/1
81 TABLET, CHEWABLE ORAL DAILY
Status: DISCONTINUED | OUTPATIENT
Start: 2023-06-17 | End: 2023-06-16

## 2023-06-16 RX ORDER — ASPIRIN 81 MG/1
81 TABLET, CHEWABLE ORAL DAILY
Status: CANCELLED | OUTPATIENT
Start: 2023-06-17

## 2023-06-16 RX ORDER — FUROSEMIDE 40 MG/1
40 TABLET ORAL DAILY
Status: DISCONTINUED | OUTPATIENT
Start: 2023-06-17 | End: 2023-06-16

## 2023-06-16 RX ORDER — AMOXICILLIN AND CLAVULANATE POTASSIUM 875; 125 MG/1; MG/1
1 TABLET, FILM COATED ORAL EVERY 12 HOURS SCHEDULED
Status: CANCELLED | OUTPATIENT
Start: 2023-06-16 | End: 2023-06-19

## 2023-06-16 RX ORDER — BISACODYL 10 MG
10 SUPPOSITORY, RECTAL RECTAL DAILY PRN
Status: DISCONTINUED | OUTPATIENT
Start: 2023-06-16 | End: 2023-06-29 | Stop reason: HOSPADM

## 2023-06-16 RX ORDER — POTASSIUM CHLORIDE 7.45 MG/ML
10 INJECTION INTRAVENOUS PRN
Status: CANCELLED | OUTPATIENT
Start: 2023-06-16

## 2023-06-16 RX ORDER — PANTOPRAZOLE SODIUM 40 MG/10ML
40 INJECTION, POWDER, LYOPHILIZED, FOR SOLUTION INTRAVENOUS 2 TIMES DAILY
Status: DISCONTINUED | OUTPATIENT
Start: 2023-06-16 | End: 2023-06-29 | Stop reason: HOSPADM

## 2023-06-16 RX ORDER — PANTOPRAZOLE SODIUM 40 MG/10ML
40 INJECTION, POWDER, LYOPHILIZED, FOR SOLUTION INTRAVENOUS 2 TIMES DAILY
Status: CANCELLED | OUTPATIENT
Start: 2023-06-16

## 2023-06-16 RX ORDER — POTASSIUM CHLORIDE 20 MEQ/1
40 TABLET, EXTENDED RELEASE ORAL PRN
Status: CANCELLED | OUTPATIENT
Start: 2023-06-16

## 2023-06-16 RX ORDER — ONDANSETRON 2 MG/ML
4 INJECTION INTRAMUSCULAR; INTRAVENOUS EVERY 6 HOURS PRN
Status: DISCONTINUED | OUTPATIENT
Start: 2023-06-16 | End: 2023-06-29 | Stop reason: HOSPADM

## 2023-06-16 RX ADMIN — ATORVASTATIN CALCIUM 40 MG: 40 TABLET, FILM COATED ORAL at 21:23

## 2023-06-16 RX ADMIN — APIXABAN 5 MG: 5 TABLET, FILM COATED ORAL at 21:23

## 2023-06-16 RX ADMIN — HYDROMORPHONE HYDROCHLORIDE 0.25 MG: 1 INJECTION, SOLUTION INTRAMUSCULAR; INTRAVENOUS; SUBCUTANEOUS at 23:04

## 2023-06-16 RX ADMIN — FUROSEMIDE 40 MG: 40 TABLET ORAL at 08:57

## 2023-06-16 RX ADMIN — ASPIRIN 81 MG: 81 TABLET, CHEWABLE ORAL at 08:57

## 2023-06-16 RX ADMIN — AMOXICILLIN AND CLAVULANATE POTASSIUM 1 TABLET: 875; 125 TABLET, FILM COATED ORAL at 21:23

## 2023-06-16 RX ADMIN — PANTOPRAZOLE SODIUM 40 MG: 40 INJECTION, POWDER, FOR SOLUTION INTRAVENOUS at 08:57

## 2023-06-16 RX ADMIN — AMLODIPINE BESYLATE 5 MG: 5 TABLET ORAL at 08:57

## 2023-06-16 RX ADMIN — HYDROMORPHONE HYDROCHLORIDE 0.25 MG: 1 INJECTION, SOLUTION INTRAMUSCULAR; INTRAVENOUS; SUBCUTANEOUS at 19:27

## 2023-06-16 RX ADMIN — AMOXICILLIN AND CLAVULANATE POTASSIUM 1 TABLET: 875; 125 TABLET, FILM COATED ORAL at 08:57

## 2023-06-16 RX ADMIN — PANTOPRAZOLE SODIUM 40 MG: 40 INJECTION, POWDER, FOR SOLUTION INTRAVENOUS at 21:23

## 2023-06-16 RX ADMIN — HYDROMORPHONE HYDROCHLORIDE 0.25 MG: 1 INJECTION, SOLUTION INTRAMUSCULAR; INTRAVENOUS; SUBCUTANEOUS at 06:49

## 2023-06-16 RX ADMIN — POTASSIUM BICARBONATE 40 MEQ: 782 TABLET, EFFERVESCENT ORAL at 15:14

## 2023-06-16 RX ADMIN — APIXABAN 5 MG: 5 TABLET, FILM COATED ORAL at 08:57

## 2023-06-16 ASSESSMENT — PAIN SCALES - GENERAL
PAINLEVEL_OUTOF10: 7
PAINLEVEL_OUTOF10: 0
PAINLEVEL_OUTOF10: 0

## 2023-06-16 ASSESSMENT — PAIN - FUNCTIONAL ASSESSMENT: PAIN_FUNCTIONAL_ASSESSMENT: PREVENTS OR INTERFERES SOME ACTIVE ACTIVITIES AND ADLS

## 2023-06-16 ASSESSMENT — PAIN DESCRIPTION - LOCATION: LOCATION: ABDOMEN

## 2023-06-16 ASSESSMENT — PAIN DESCRIPTION - ONSET: ONSET: ON-GOING

## 2023-06-16 ASSESSMENT — PAIN SCALES - WONG BAKER
WONGBAKER_NUMERICALRESPONSE: 0
WONGBAKER_NUMERICALRESPONSE: 6;8

## 2023-06-16 ASSESSMENT — PAIN DESCRIPTION - FREQUENCY: FREQUENCY: INTERMITTENT

## 2023-06-16 ASSESSMENT — PAIN DESCRIPTION - PAIN TYPE: TYPE: ACUTE PAIN

## 2023-06-16 ASSESSMENT — PAIN DESCRIPTION - ORIENTATION: ORIENTATION: LEFT

## 2023-06-16 ASSESSMENT — PAIN DESCRIPTION - DESCRIPTORS: DESCRIPTORS: ACHING

## 2023-06-16 NOTE — CARE COORDINATION
Received approval for admission to ARU. Auth #[id number]. Notified MD and CM of approval.     Presented to patients room to discuss ARU approval. Patient asleep and no family at  bedside. Left VM for patients son Kellie Kerr), requesting a return call back. Per MD patient is medically ready for discharge today. Spoke with patients nurse, requesting patients BP to be rechecked d/t elevated BP noted.

## 2023-06-16 NOTE — H&P
were present throughout. There was no rebound, guarding or masses noted. Spinal exam: Prominent bony landmarks over the dorsal spine with pink skin but no open areas or gross malformation. Upper extremities: Patient was spontaneously moving her right upper limb with very poor  and poor coordination. Sensory exam was compromised by her language parents. No subluxation at the shoulder. Lower extremities: Coarse movements with limited range of motion at the knee and ankle on the right. Left calf is soft. Right calf is minimally tender. Both heels are clear. Sitting balance was fair-.  Standing balance was poor. Lab Results   Component Value Date    WBC 7.8 06/13/2023    HGB 7.9 (L) 06/13/2023    HCT 32.3 (L) 06/13/2023    MCV 96.1 06/13/2023     06/13/2023     No results found for: INR, PROTIME  Lab Results   Component Value Date    CREATININE 0.6 06/16/2023    BUN 16 06/16/2023     06/16/2023    K 3.1 (L) 06/16/2023     06/16/2023    CO2 28 06/16/2023     Lab Results   Component Value Date    ALT 11 06/13/2023    AST 22 06/13/2023    ALKPHOS 137 (H) 06/13/2023    BILITOT 0.2 06/13/2023         Impression: 80-year-old female with a history of atrial fibrillation, CHF and gastric bypass with GJ anastomosis ulceration with GI bleed. 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 This has been complicated by aspiration pneumonia. Strengths for the patient: Supportive family, some experience with adaptive equipment and a reasonably accessible home. Limitations/barriers for the patient: Her risk for aspiration, her lack of language, her restlessness and her age. Recommendation: Acute inpatient rehabilitation with occupational and physical therapy 180 minutes 5 out of every 7 days. Will address basic and  advancing mobility with self-care instruction and adaptive equipment training.

## 2023-06-16 NOTE — DISCHARGE SUMMARY
Discharge Summary    Name:  [place] Scale /Age/Sex: 1934  (80 y.o. female)   MRN & CSN:  [id number] & [id number] Admission Date/Time: 6/10/2023 10:16 PM   Attending:  Jonnie Christianson MD Discharging Physician: Jonnie Christianson MD     Discharge diagnosis and plan:    Acute left frontal CVA-seen on MRI brain. Due to size of stroke, neurology recommended to hold off on anticoagulation/antiplatelets for at least 5 days. Initiated eliquis and aspirin from 6/15/2023. Right sided deficits continue to show gradual improvement. Patient is being discharged to ARU. Dysphagia 2/2 above - patient had been NPO since admission. Discussed about NG tube - family agreed, NG tube placed the same day and started on tube feed. Dietary team onboard for tube feed management. NG tube was found to be misplaced on repeat xray done on 6/15/2023, which was re-inserted and tube feed restarted again. Please continue to monitor for potential NG tube displacement. NG tube is at around 60 cm felisa at the nares. Repeat xray if patient starts to have regurgitation of food. Aspiration penumonia. CXR only shows cardiomegaly with pulmonary vascular congestion at admission. CXR/Abdominal Xray done after NG tube placement 2023 showed left retrocardiac infiltrates. Was started on cefepime at admission. Blood cultures remain negative to date. Respiratory PCR negative. Change to amoxicillin/clavulanic acid for aspiration pneumonia coverage. Escalate again if needed. Plan for 7 days course of antibiotics. GJ anastomotic ulcer, hx of gastric bypass surgery - on PPI twice daily for 8 to 12 weeks. Resume Carafate if able. Concern for GI bleed at [facility] 6/3-. Last EGD  (on Care Everywhere). Acute hypoxic respiratory failure, resolved  1.4 cm incidental thyroid nodule-seen on CT scan. Will need outpatient follow. Slightly elevated troponin-EKG shows A-fib. Likely demand ischemia, no concern for ACS for now.

## 2023-06-16 NOTE — CARE COORDINATION
Pt approved to go to ARU. Ariel Wan stated she will inform the doctor Possible discharge to ARU today.

## 2023-06-16 NOTE — PLAN OF CARE
ARU Interdisciplinary Plan of Care [facility] Dr. Hiram Owen [facility], [address]  [phone]  Fax: [phone]        Leon Torre    : 1934  Acct #: [de-identified]  MRN: [id number]   PHYSICIAN:  Ronal Lezama MD  Primary Active Problems:   Active Hospital Problems    Diagnosis Date Noted    Hemiparesis of right dominant side as late effect of cerebral infarction [facility]) [I69.351] 2023    Dysphagia due to recent stroke [I69.391] 2023    Paroxysmal atrial fibrillation (HCC) [I48.0] 2023    Chronic diastolic (congestive) heart failure (HCC) [I50.32] 2023    Acute blood loss anemia [D62] 2023    Peptic ulcer disease [K27.9] 2023    Aspiration pneumonia of both lungs due to regurgitated food ([address] [J69.0] 2023    Aphasia due to acute stroke ([address] [I63.9, R47.01] 2023    Dysarthria due to acute stroke ([address] [I63.9, R47.1] 2023    Acute CVA (cerebrovascular accident) [facility]) [I63.9] 2023       Rehabilitation Diagnosis:     Cerebral infarction, unspecified [I63.9]  Acute CVA (cerebrovascular accident) [facility]) [I63.9]          CARE PLAN     NURSING:  Leon Torre while on this unit will:      Bowel and Bladder   [x] Be continent of bowel and bladder      [x] Have an adequate number of bowel movements   [x] Urinate with no urinary retention >300ml in bladder   [] Bladder Scan: (details)   [] Complete bladder protocol with todd removal   [] Initiate Bladder Program to toilet every ___ hours   [] Initiate Bowel Program to toilet every ___hours   [] Bladder training    [] Bowel training  Pulmonary   [x] Maintain O2 SATs at 92% or greater  Pain Management   [x] Have pain managed while on ARU        [] Be pain free by discharge    [x] Medication Management and Education  Maintenance of Skin Integrity/Wound Management   [x] Have no skin breakdown while on ARU   [] Have improved skin

## 2023-06-16 NOTE — PROGRESS NOTES
06/14/23 1726   Encounter Summary   Encounter Overview/Reason  Attempted Encounter   Service Provided For: Patient not available   Referral/Consult From: Chet   Last Encounter  06/14/23  (patient was not available at the tie of visit.)   Begin Time 1725   End Time  1727   Total Time Calculated 2 min   Assessment/Intervention/Outcome   Assessment Unable to assess   Plan and Referrals   Plan/Referrals No future visits requested
[id number] [facility]  consulted by DOMINIQUE Painter for monitoring and adjustment. Indication for treatment: Vancomycin indication: Other: Fever  Goal trough: Trough Goal: 15-20 mcg/mL  AUC/CHAYITO: 400-600    Risk Factors for MRSA Identified:   Hospitalization within the past 90 days    Pertinent Laboratory Values:   Temp Readings from Last 3 Encounters:   06/12/23 99.9 °F (37.7 °C) (Axillary)     Recent Labs     06/10/23  2324 06/11/23  0959 06/12/23  0330   WBC 10.4 8.9 8.8     Recent Labs     06/10/23  2324 06/11/23  0959 06/12/23  0330   BUN 25* 23 22   CREATININE 0.9 0.8 0.9     Estimated Creatinine Clearance: 35 mL/min (based on SCr of 0.9 mg/dL). Intake/Output Summary (Last 24 hours) at 6/12/2023 0427  Last data filed at 6/12/2023 8321  Gross per 24 hour   Intake --   Output 2050 ml   Net -2050 ml       Pertinent Cultures:   Date    Source    Results  06/12   Blood    In process    Vancomycin level:   TROUGH:  No results for input(s): VANCOTROUGH in the last 72 hours. RANDOM:  No results for input(s): VANCORANDOM in the last 72 hours. Assessment:  HPI: 80year old female who is on admission for right-sided weakness, aphasia and altered mental status spiked a temperature of 101 early this morning. Patient 's antibiotic was broadened to cefepime and vancomycin. SCr, BUN, and urine output: Scr = 0.9 (baseline unknown)  Day(s) of therapy: 1 of 7  Vancomycin concentration: Pending    Plan:  Vancomycin 1000 mg IVPB q24h  Predicted trough of 14.7 and AUC: 479 at steady-state  Pharmacy will continue to monitor patient and adjust therapy as indicated    VANCOMYCIN CONCENTRATION SCHEDULED FOR 6/14 @0400    Thank you for the consult.   Tawana Gray [facility]  6/12/2023 4:27 AM
[id number] [facility] SPEECH/LANGUAGE PATHOLOGY  DAILY PROGRESS NOTE  Iliana Batista  6/14/2023  [id number]  Aphasia [R47.01]  Elevated troponin [R77.8]  Change in mental status [R41.82]  Altered mental status, unspecified altered mental status type [R41.82]  Anemia, unspecified type [D64.9]  Allergies   Allergen Reactions    Motrin [Ibuprofen]     Tramadol          Pt was seen this date for dysphagia treatment and speech/language evaluation. IMPRESSION AND RECOMMENDATIONS:    Dysphagia tx:  Iliana Batista was seen for ongoing clinical dysphagia evaluation. She was seated upright in bed, alert, cooperative. She was presented with limited PO trials of ice chips. She presents with continued severe oral dysphagia characterized by decreased mandibular movement, reduced labial seal with anterior loss of bolus, and absent lingual movement for bolus formation/AP transit. Pt did not benefit from verbal or tactile cues to facilitate bolus manipulation or swallow. Majority of bolus lost anteriorly. No swallow response noted. Further trials deferred d/t severity of impairments. Speech/Language tx: Targeted language comprehension via yes/no questions, 1-step commands, object identification task. Attempted to establish system for yes/no communication. Pt unable to repeat yes/no verbally, and did not nod/shake head when instructed. She inconsistently appeared to shake her head \"yes\" in response to all questions. She did not shake her head \"no\" despite models and instruction. She also did not follow directions and did not respond to object identification task. She intermittently produced low vocalic sound. Hand-over-hand assistance was provided as well as modeling for partial completion of tasks. Recommend continued assessment for possible diet initiation/instrumental swallow evaluation, continued tx to address speech and language deficits; see goals below.  D/w family at bedside, who
[address] [facility]  consulted by DOMINIQUE Brunner for monitoring and adjustment. Indication for treatment: Vancomycin indication: Other: Fever  Goal trough: Trough Goal: 15-20 mcg/mL  AUC/CHAYITO: 400-600    Risk Factors for MRSA Identified:   Hospitalization within the past 90 days    Pertinent Laboratory Values:   Temp Readings from Last 3 Encounters:   06/13/23 98.3 °F (36.8 °C) (Oral)     Recent Labs     06/12/23  0330 06/12/23  1231 06/13/23  0143   WBC 8.8 8.7 7.8       Recent Labs     06/12/23  0330 06/12/23  1231 06/13/23  0143   BUN 22 21 19   CREATININE 0.9 0.8 0.7       Estimated Creatinine Clearance: 45 mL/min (based on SCr of 0.7 mg/dL). Intake/Output Summary (Last 24 hours) at 6/13/2023 1530  Last data filed at 6/13/2023 1022  Gross per 24 hour   Intake --   Output 2000 ml   Net -2000 ml         Pertinent Cultures:   Date    Source    Results  06/12   Blood    NGTD x24h  06/12   Sputum   Pending  06/12   Respiratory PCR  Negative  06/13   MRSA nares   Ordered      Vancomycin level:   TROUGH:  No results for input(s): VANCOTROUGH in the last 72 hours. RANDOM:  No results for input(s): VANCORANDOM in the last 72 hours. Assessment:  HPI: 80year old female who is on admission for right-sided weakness, aphasia and altered mental status spiked a temperature of 101 early this morning. Patient 's antibiotic was broadened to cefepime and vancomycin.   SCr, BUN, and urine output:   Scr = 0.9 (baseline unknown) on admission  SCr remains stable in range 0.7-0.9; BUN WNL  Day(s) of therapy: 2 of 7  Vancomycin concentration: Pending    Plan:  Vancomycin 1000 mg IVPB q24h  Predicted trough of 12.2 and AUC: 425 at steady-state  Pharmacy will continue to monitor patient and adjust therapy as indicated    VANCOMYCIN CONCENTRATION SCHEDULED FOR 6/14 @0400    Thank you for the consult,  Davis Cole, [facility], PharmD  6/13/2023 3:30 PM
APRN notified of T 101, R 18, HR 75, /72; Pt currently on IV Rocephin. Will add IV Vanc and Cefepime for coverage (?) sepsis. AMS vs CVA. PMH includes CHF, AFIB; Will repeat VBG, check BNP, CBC, CMP, Blood cultures, ProCal, Viral Resp Panel, Lactate. Pt was recent admit from [facility]. RN at bedside updated.
Cardiology Progress Note     Admit Date:  6/10/2023    Consult reason/ Seen today for :       Subjective and  Overnight Events : MRI confirms left frontal infarct right side has chronic encephalomalacia      Chief complain on admission : 80 y. o.year old who is admitted for  Chief Complaint   Patient presents with    Altered Mental Status      Assessment / Plan:  Acute CVA : she is aphasic at this time she is 80years old not on anticoagulation due to recurrent GI bleeding and anemia was planned for outpatient Watchman may still be a candidate to prevent further strokes depending on clinical course and long-term prognosis? Due to concerns for hemorrhagic conversion anticoagulation antiplatelets are on hold as per neurology  Recent echo shows normal LV function at 1 [facility] within the last 1 week heart failure with preserved EF BNP elevated which could be appropriate for age or for her baseline watch volume status difficult to a certain especially with poor p.o. intake on Lasix 40 mg at home currently on hold hold BNP is elevated chest x-ray also shows pulmonary edema  Discussed with daughter-in-law at bedside that if she does not improve further and her quality of life is more or less bedbound and if she is being considered for palliative care eventually then watchman may not be a good option. It is a viable option if they continue to be aggressive to prevent future strokes  Empirically on antibiotics due to spiking fever  HTN: stable, continue present medications   Atrial Fibrillation: Continue rate control strategy rate ranging anywhere from 40s to 100s without any medication continue to monitor junction at home currently not on any meds for rate lowering  DVT prophylaxis if no contraindication  6. Dyslipidemia: continue statins      Past medical history:    has no past medical history on file.   Past surgical history:   has no
Cardiology Progress Note    Subjective/Overnight Events:  Patient experiencing excessive aphasia. Spoke with son and family at bedside. Patient's son states that she is normally able to would already be today living and has good functional capacity prior to her stroke. Patient is not currently complaining of any chest pain, palpitations, headache, dizziness, shortness of breath. She is still in atrial fibrillation but is rate controlled. Patient has been unable to take anticoagulant due to history of GI bleeding as well as anemia. Assessment/Plan:  Persistent atrial fibrillation  -PCA8YC4-JPIa: 7  Has bled: 4   -Currently rate controlled 60s-70s  -Goal potassium 4.0-4.5, magnesium 2.0-2.2. Replete per protocol   -Can consider outpatient Watchman procedure: Pending on prognosis  -Home digoxin held  Acute chronic chronic HFpEF  Valvular heart disease: Mild-moderate mitral regurgitation, mild aortic regurgitation and stenosis  -BNP elevated  -Strict I's and O's Daily weights  -Echo showing preserved EF  -Continue IV Lasix 40 mg daily  Hypertension  -Blood pressure elevated today  -Not on home antihypertensives  Superficial thrombosis of Right Cephalic Vein  Acute left frontal CVA  -Expressive aphasia at this time. Likely secondary to atrial fibrillation  -Neurology on board  Peptic ulcer disease  -GI following. Recent EGD at in  Acute on chronic anemia: Hgb 7.9. Continue to monitor    Echo: 6/12/2023   Left ventricular systolic function is normal.   Ejection fraction is visually estimated at 55-60%. Moderately dilated left atrium. Mild to moderate mitral regurgitation. Mild aortic regurgitation. Mild aortic stenosis with a mean gradient of 13 mmHg, BRISA: 1.55 cm sq. Sclerotic non-coronary cusp of the aortic valve. Mild tricuspid regurgitation; RVSP: 47 mmHg. Mitral leaflets are thickened   Mild pulmonic regurgitation present. No evidence of any pericardial effusion.     Nora Nelson
Cardiology Progress Note    Subjective/Overnight Events:  Patient sleeping during examination today. Spoke with patient's daughter about current care. She is still in atrial fibrillation but is rate controlled. Patient has been unable to take anticoagulant due to history of GI bleeding as well as anemia. Now with Aspiration pneumonia, on tube feed    Assessment/Plan:  Persistent atrial fibrillation  -PBP9NP2-BPEm: 7  Has bled: 4   -Currently rate controlled 60s-70s  -Goal potassium 4.0-4.5, magnesium 2.0-2.2. Replete per protocol   -Can consider outpatient Watchman procedure: Pending on prognosis, if aggressive treatment desired, may perform in future  -Home digoxin held  -Poor AC candidate  Acute chronic chronic HFpEF  Valvular heart disease: Mild-moderate mitral regurgitation, mild aortic regurgitation and stenosis  -BNP elevated  -Strict I's and O's Daily weights. -4.9 L  -Echo showing preserved EF  -Switch to oral lasix 40 mg daily  Hypertension  -Blood pressure elevated today  -Initiated on norvasc 5 mg daily  Superficial thrombosis of Right Cephalic Vein  Acute left frontal CVA  -Expressive aphasia at this time. Likely secondary to atrial fibrillation  -Neurology on board. Okay with starting antiplatelet/anticoag on 06/15  Aspiration pneumonia  Acute on chronic anemia: Hgb 7.9. Continue to monitor    Echo: 6/12/2023   Left ventricular systolic function is normal.   Ejection fraction is visually estimated at 55-60%. Moderately dilated left atrium. Mild to moderate mitral regurgitation. Mild aortic regurgitation. Mild aortic stenosis with a mean gradient of 13 mmHg, BRISA: 1.55 cm sq. Sclerotic non-coronary cusp of the aortic valve. Mild tricuspid regurgitation; RVSP: 47 mmHg. Mitral leaflets are thickened   Mild pulmonic regurgitation present. No evidence of any pericardial effusion.     Jerod Baird PA-C 06/14/23 4:18 PM
Neurology Service Progress Note  [address]   Patient Name: Erick Hollis  : 1934        Subjective:   CC: AMS, right sided weakness  Chart was reviewed in detail, patient was seen and assessed. Family is at bedside today. Patient is more awake and sitting more  upright today. Family tells me she is having increased movement in her right upper and lower extremity. They feel she is trying to talk more but speech is still significantly impaired. Feel she was able to squeeze to command with the left hand for me today. History reviewed. No pertinent past medical history. :   No past surgical history on file. Medications:  Scheduled Meds:   amoxicillin-clavulanate  1 tablet Oral 2 times per day    furosemide  40 mg IntraVENous Daily    pantoprazole  40 mg IntraVENous BID    enoxaparin  40 mg SubCUTAneous Daily    atorvastatin  40 mg Oral Nightly     Continuous Infusions:    PRN Meds:.hydrALAZINE, labetalol, HYDROmorphone, potassium chloride **OR** potassium alternative oral replacement **OR** potassium chloride, magnesium sulfate, ondansetron **OR** ondansetron, polyethylene glycol, acetaminophen    Allergies   Allergen Reactions    Motrin [Ibuprofen]     Tramadol      Social History     Socioeconomic History    Marital status:       Spouse name: Not on file    Number of children: Not on file    Years of education: Not on file    Highest education level: Not on file   Occupational History    Not on file   Tobacco Use    Smoking status: Not on file    Smokeless tobacco: Not on file   Substance and Sexual Activity    Alcohol use: Not on file    Drug use: Not on file    Sexual activity: Not on file   Other Topics Concern    Not on file   Social History Narrative    Not on file     Social Determinants of Health     Financial Resource Strain: Not on file   Food Insecurity: Not on file   Transportation Needs: Not on file   Physical Activity: Not on file   Stress: Not on file
Neurology Service Progress Note  [address]   Patient Name: Roslyn Mei  : 1934        Subjective:   CC: AMS, right sided weakness  Chart was reviewed in detail, patient was seen and assessed. Family is at bedside today. She is awake and looks towards voice but does not respond verbally. Remains aphasic. Family tells me she is nodding her head yes and no appropriately to the questions or asking. She is having some spontaneous movement in the right upper and lower extremity. Reviewed imaging with the family members at bedside confirming acute stroke. History reviewed. No pertinent past medical history. :   No past surgical history on file. Medications:  Scheduled Meds:   cefepime  2,000 mg IntraVENous Q12H    vancomycin  1,000 mg IntraVENous Q24H    furosemide  40 mg IntraVENous Daily    pantoprazole  40 mg IntraVENous BID    enoxaparin  40 mg SubCUTAneous Daily    atorvastatin  40 mg Oral Nightly     Continuous Infusions:   dextrose 5 % and 0.9 % NaCl 50 mL/hr at 23 1727     PRN Meds:.potassium chloride **OR** potassium alternative oral replacement **OR** potassium chloride, magnesium sulfate, ondansetron **OR** ondansetron, polyethylene glycol, acetaminophen    Allergies   Allergen Reactions    Motrin [Ibuprofen]     Tramadol      Social History     Socioeconomic History    Marital status:       Spouse name: Not on file    Number of children: Not on file    Years of education: Not on file    Highest education level: Not on file   Occupational History    Not on file   Tobacco Use    Smoking status: Not on file    Smokeless tobacco: Not on file   Substance and Sexual Activity    Alcohol use: Not on file    Drug use: Not on file    Sexual activity: Not on file   Other Topics Concern    Not on file   Social History Narrative    Not on file     Social Determinants of Health     Financial Resource Strain: Not on file   Food Insecurity: Not on file   Transportation
Nurse assignment change. Gave report to Radha olson RN.
Occupational Therapy      Occupational Therapy Treatment Note    Name: Beulah Bocanegra MRN: [id number] :   1934   Date:  6/15/2023   Admission Date: 6/10/2023 Room:  [address] [place] [address]-A     Primary Problem:  change in mental status    Restrictions/Precautions:          General Precautions, Fall Risk, NG tube    Communication with other providers: Nursing handoff    Subjective:  Patient states:  Pt shaking head yes when asked if she wanted to return to bed  Pain:   Location, Type, Intensity (0/10 to 10/10): Denies    Objective:    Observation: Pt received sitting upright in chair, agreeable ot therapy   Objective Measures:  WFL    Treatment, including education:  Therapeutic Activity Training:   Therapeutic activity training was instructed today. Cues were given for safety, sequence, UE/LE placement, awareness, and balance. Activities performed today included STS ,stand step, stand to sit, sit to supine, scooting to [facility]    STS from reclining chair up to stand modA, stand step modA, stand to sit modA, sit to supine modA, scooting to [id number] [employer].  Pt fatigued    Pt supine in bed, bed alarm on, call light at side, nursing notified    Assessment / Impression:    Patient's tolerance of treatment: Well  Adverse Reaction: None  Significant change in status and impact: Improved from initial evaluation  Barriers to improvement: None noted      Plan for Next Session:    Continue OT POC    Time in:  1418  Time out:  1427  Timed treatment minutes:  9 minutes  Total treatment time:  9 minutes      Electronically signed by:    Peter COSTA [id number]  2:58 PM,6/15/2023
Occupational Therapy      Occupational Therapy Treatment Note    Name: Vidal Gary MRN: [id number] :   1934   Date:  2023   Admission Date: 6/10/2023 Room:  [address] [place] [address]-A     Primary Problem:  Change in mental status    Restrictions/Precautions:          General Precautions, Fall Risk    Communication with other providers: Nursing handoff    Subjective:  Patient states:  Pt w/ expressive aphasia  Pain:   Location, Type, Intensity (0/10 to 10/10): Denies    Objective:    Observation: Pt received supine in bed, NG tube, daughter in room, agreeable to therapy   Objective Measures:  [facility]    Treatment, including education:  Therapeutic Activity Training:   Therapeutic activity training was instructed today. Cues were given for safety, sequence, UE/LE placement, awareness, and balance. Activities performed today included bed mobility training, sup-sit, sit-stand, functional mobility, stand to sit    Self Care Training:   Cues were given for safety, sequence, UE/LE placement, visual cues, and balance. Activities performed today included grooming    Supine to sit modA, sitting EOB SBA, STS from EOB up to RW Kasandra, stand step from EOB ~5 feet Kasandra, stand to sit Kasandra. Grooming washing hands/face w/ warm washcloth Kasandra.     Pt sitting upright in chair, chair alarm on, call light at side, nursing notified     Assessment / Impression:    Patient's tolerance of treatment: Well  Adverse Reaction: None  Significant change in status and impact: Improved from initial evaluation  Barriers to improvement: None noted      Plan for Next Session:    Continue OT POC    Time in:  1258  Time out:  1322  Timed treatment minutes:  24 minutes  Total treatment time:  24 minutes      Electronically signed by:    Noreen FUNES/L [id number]  1:54 PM,2023
Occupational Therapy    Occupational Therapy Treatment Note    Name: Umer Stewart MRN: [id number] :   1934   Date:  2023   Admission Date: 6/10/2023 Room:  [address] [place] [address]-A     Primary Problem:  Change in mental status    Restrictions/Precautions:  General Precautions, Fall Risk, NG for feeding    Communication with other providers: RN approved session for therapy. Subjective:  Patient states:  Pt agreeable to therapy session and nodded. Pain:   Location, Type, Intensity (0/10 to 10/10): Denies pain. Objective:    Observation: Pt supine in bed upon arrival.    Objective Measures:  Pt alert and oriented to self. Treatment, including education:  Self Care Training:   Cues were given for safety, sequence, UE/LE placement, visual cues, and balance. Activities performed today included UB/LB dressing tasks, toileting, hand hygiene at sink    Pt supine in bed and completed sup to sit with head of bed slightly elevated and completed  with MOD A for LE and MIN A for UE. Pt seated edge of bed for sitting tolerance with SBA. Pt completed sit to stand from edge of bed with gait belt donned and MIN A for SPT to chair. Pt noted incontinent episode and seated in chair. Pt completed SPT from chair to toilet with MIN A and doffed undergarment with MAX A to DEP A. Pt unable to void. Pt donned undergarment with DEP A and completed completed pericare with DEP A. Pt completed sit to stand from toilet with MIN A. Pt completed SPT to chair with MIN A. Pt seated in chair with all needs met and call light in reach and chair alarm on. Assessment / Impression:    Patient's tolerance of treatment: fair  Adverse Reaction: None  Significant change in status and impact:   Barriers to improvement: None noted      Plan for Next Session:    Continue OT POC and progress functional transfers and LE ADLs.     Time in:  1008  Time out:  1035  Timed treatment minutes:  27  Total treatment time:  27      Electronically
Occupational Therapy  Occupational Therapy Treatment Note    Date:  2023   Room:  [address] [place] [address] :   1934   MRN: [id number] Admission Date: 6/10/2023     Diagnosis:  The primary encounter diagnosis was Altered mental status, unspecified altered mental status type. Diagnoses of Anemia, unspecified type, Elevated troponin, and Aphasia were also pertinent to this visit. Restrictions/Precautions:                      Communication with other providers:      Subjective:  Patient states:  does not verbalize    Pain:   Location, Type, Intensity (0/10 to 10/10):  none    Objective:    Orientation: not tested  Observation: Pt received in chair, NGT, call light activated (by family)  Objective Measures:  VSS    Treatment, including education:  Self Care Training:   Self care training was performed today. Cues were given for safety, sequence, UE/LE placement, visual cues, and balance.       Supine to sit: n/a    Sit to stand: ModA from recliner 2 reps and low commode 1 rep    Step pivot: Hedy chair to low commode, and low commode back to chair, extensive cuing for direction and hand placement    Gait: n/a    Stand to sit: MaxA, reduced safety, reduced eccentric control    Toileting: ModA, assisted with depends hiking knee<>hip, cued for britt wiping while standing      Assessment / Impression:      Patient's tolerance of treatment:  good   Significant change in status and impact:    Barriers to improvement:    Recommendations: SNF    Plan for Next Session:    Cont POC addressing goals:    Goals:  Pt goal: go home  Time Frame for STGs: discharge  Goal 1: Pt will perform UE ADLs Independent  Goal 2: Pt will perform LE ADLs Independent  Goal 3: Pt will perform toileting Independent  Goal 4: Pt will perform functional transfer w/ AD Fela  Goal 5: Pt will perform functional mobility w/ AD Fela  Goal 6: Pt will perform therex/theract in order to increase functional activity tolerance and dynamic
Patient becoming restless, wanting to get out of bed. Patients granddaughter who's in the room says that she is a night owl and never lays in bed for a long time. She also always had pain in her legs . AUNDREA rangel served and orders acknowledged.
Physical Therapy Treatment Note  Name: Felipa Garza MRN: [id number] :   1934   Date:  6/15/2023   Admission Date: 6/10/2023 Room:  [address] [place] [address]     Restrictions/Precautions:          general precautions, fall risk, CVA, NG tube    Communication with other providers:  RN, SLP    Subjective:  Patient states:  pt nods yes/no  Pain:   Location, Type, Intensity (0/10 to 10/10):  denies pain at rest. Pain with urination this date    Objective:    Observation:  pt sitting EOB with visitor present upon entry    Treatment, including education/measures:    Transfers: pt completed stand pivot from EOB to chair mod A with cues for sequencing    Therapeutic exercise: pt completed x5 LAQ on R LE with 2 assisted repetition and 3 assisted repetitions.  Cues provided for sequencing and technique    Balance: pt sat unsupported ~15 minutes CGA to min A with R lateral lean and forward flexed posture    Assessment / Impression:    Pt up in chair at end of session with needs in reach, alarm on, and SLP in room    Patient's tolerance of treatment:  fair   Adverse Reaction: n/a  Significant change in status and impact:  n/a  Barriers to improvement:  decreased endurance, impaired communication    Plan for Next Session:    Continue to address transfer training and gait training in future sessions    Time in:  1030  Time out:  1054  Timed treatment minutes:  24  Total treatment time:  24    Previously filed items:  Social/Functional History  Lives With: Son, Family  Type of Home: House  Home Layout: One level  Home Access: Stairs to enter with rails  Entrance Stairs - Number of Steps: 2  Entrance Stairs - Rails: Left  Bathroom Shower/Tub: Tub/Shower unit  Bathroom Toilet: Standard  Bathroom Equipment: Shower chair  Bathroom Accessibility: Accessible  Home Equipment: Walker, rolling, Cane (last 2 weeks transitioned to [employer])  Has the patient had two or more falls in the past year or any fall with injury in the past year?:
Physical Therapy Treatment Note  Name: Samantha Saavedra MRN: [id number] :   1934   Date:  2023   Admission Date: 6/10/2023 Room:  [address] [place] [address]     Restrictions/Precautions:          general precautions, CVA, fall risk, NG    Communication with other providers:  RN    Subjective:  Patient states:  pt nods yes/no  Pain:   Location, Type, Intensity (0/10 to 10/10):  denies pain    Objective:    Observation:  pt up in chair upon entry. Pt fatigued this session and family stating pt had just had emesis     Treatment, including education/measures:    Transfers: pt completed STS to/from chair max A with cues for hand placement    Balance: pt stood with unilateral HHA ~45 seconds mod A with retropulsion. Cues provided for upright posture    Pt able to follow ~75% of commands this date and able to hold R LE out against gravity when asked which is much improved from yesterday    Assessment / Impression:    Pt up in chair at end of session with alarm on and family present. Session limited due to pt's fatigue after emesis.     Patient's tolerance of treatment:  fair   Adverse Reaction: n/a  Significant change in status and impact:  n/a  Barriers to improvement:  decreased endurance, impaired command following    Plan for Next Session:    Continue to address transfer training, standing balance, and gait training in future sessions    Time in:  1349  Time out:  1402  Timed treatment minutes:  13  Total treatment time:  13    Previously filed items:  Social/Functional History  Lives With: Son, Family  Type of Home: House  Home Layout: One level  Home Access: Stairs to enter with rails  Entrance Stairs - Number of Steps: 2  Entrance Stairs - Rails: Left  Bathroom Shower/Tub: Tub/Shower unit  Bathroom Toilet: Standard  Bathroom Equipment: Shower chair  Bathroom Accessibility: Accessible  Home Equipment: Walker, rolling, Cane (last 2 weeks transitioned to cane/walker)  Has the patient had two or more falls in the past
Physician Progress Note      Eliz Pedro  CSN #:                  [id number]  :                       1934  ADMIT DATE:       6/10/2023 10:16 PM  [address] [place] DATE:  RESPONDING  PROVIDER #:        Paulino Resendez MD          QUERY TEXT:    Patient admitted with acute CVA. Noted documentation of acute respiratory   failure in  PN. In order to support the diagnosis of acute respiratory   failure, please include additional clinical indicators in your documentation. Or please document if the diagnosis of acute respiratory failure has been   ruled out after further study. The medical record reflects the following:  Risk Factors: Acute CVA, poss aspiration PNA  Clinical Indicators:  PN \"Acute hypoxic respiratory failure, resolved\", R   18, SpO2 94%-100% on and off O2, ED Provider note \"Lungs diminished in the   bases. Zebedee Martyr Respirations nonlabored\", H&P \"RESP  -LS CTA equal bilat, no wheezes,   rales or rhonchi. Symmetric chest movement. No respiratory distress note\"  Treatment: O2 0-3 L/NC,    Acute Respiratory Failure Clinical Indicators per  MS-DRG Training Guide and   Quick Reference Guide:  pO2 < 60 mmHg or SpO2 (pulse oximetry) < 91% breathing room air  pCO2 > 50 and pH < 7.35  P/F ratio (pO2 / FIO2) < 300  pO2 decrease or pCO2 increase by 10 mmHg from baseline (if known)  Supplemental oxygen of 40% or more  Presence of respiratory distress, tachypnea, dyspnea, shortness of breath,   wheezing  Unable to speak in complete sentences  Use of accessory muscles to breathe  Extreme anxiety and feeling of impending doom  Tripod position    Thank you,  RADHA IrahetaN, RN, CDS  [phone]  Options provided:  -- Acute Respiratory Failure as evidenced by, Please document evidence.   -- Acute Respiratory Failure ruled out after study  -- Other - I will add my own diagnosis  -- Disagree - Not applicable / Not valid  -- Disagree - Clinically unable to determine / Unknown  -- Refer to
V2.0  [facility] [place]      Name:  Humera Antonio /Age/Sex: 1934  (80 y.o. female)   MRN & CSN:  [id number] & [id number] Encounter Date/Time: 2023 2:51 PM EDT    Location:  8899/2745-V PCP: Gorge Hoffman Elías Deng Day: 3    Assessment and Plan:   Humera Antonio is a 80 y.o. female who presents with right-sided weakness and aphasia due to CVA      Plan:  Acute left frontal CVA-seen on MRI brain. Due to size of stroke, neurology recommends to hold off on anticoagulation for 5 days. GJ anastomotic ulcer-on PPI twice daily for 8 to 12 weeks. Cannot give Carafate at this time due to patient being n.p.o. Concern for GI bleed at [facility] 6/3-. Last EGD  (on Care Everywhere). Fever-Tmax 101. Possible aspiration post stroke. CXR only shows cardiomegaly with pulmonary vascular congestion. On broad-spectrum antibiotics. Checking blood cultures. Respiratory PCR negative. Check respiratory culture. Possible hypoxia-on 1 L nasal cannula. Attempt to wean oxygen. BNP 6.8K. CXR: Pulm vascular congestion. On Lasix. 1.4 cm incidental thyroid nodule-seen on CT scan. Will need outpatient follow. Slightly elevated troponin-EKG shows A-fib. Anemia-hemoglobin 7.4. Recent GI bleed at [facility]. A-fib-May still need watchman versus anticoagulation. Diet Diet NPO    DVT Prophylaxis [x] Lovenox, [] Heparin, [] Eliquis, [] Xarelto  [] SCDs       Code Status Full Code   Disposition From: Home  Expected Disposition: TBD  Estimated Date of Discharge:   Patient requires continued admission due to stroke         Personally reviewed Lab Studies and Imaging         Subjective/Interval history:   Chief Complaint: Altered Mental Status     As per family patient is doing a little better today  Moving right leg a little more. Had fever last night    Review of Systems:    Negative unless mentioned above    Objective:      Intake/Output Summary (Last 24
V2.0  [facility] [place]      Name:  Noreen Alvarado /Age/Sex: 1934  (80 y.o. female)   MRN & CSN:  [id number] & [id number] Encounter Date/Time: 6/15/2023 2:51 PM EDT    Location:  9173/9998-C PCP: Gorge Vilchis Day: 6    Assessment and Plan:   Noreen Alvarado is a 80 y.o. female who presents with right-sided weakness and aphasia due to CVA      Plan:  Acute left frontal CVA-seen on MRI brain. Due to size of stroke, neurology recommended to hold off on anticoagulation/antiplatelets for at least 5 days. Initiated eliquis and aspirin from 6/15/2023. Right sided deficits continue to show minimal and gradual improvement. Patient is for ARU. Dysphagia 2/2 above - patient had been NPO since admission. Discussed about NG tube - family agreeable, placed the same day and started on tube feed. Dietary team consulted for tube feed management. Patient not tolerating current feed and dietary team making adjustments. NG tube was found to be misplaced on repeat xray done on 6/15/2023 - to be replaced and reconfirmed. Aspiration penumonia. CXR only shows cardiomegaly with pulmonary vascular congestion at admission. CXR/Abdominal Xray done after NG tube placement shows left retrocardiac infiltrates. Was started on cefepime. Blood cultures remain negative to date. Respiratory PCR negative. Follow  respiratory culture. Change to amoxicillin/clavulanic acid for aspiration pneumonia coverage. Escalate again if needed. GJ anastomotic ulcer, hx of gastric bypass surgery - on PPI twice daily for 8 to 12 weeks. Cannot give Carafate at this time due to patient being n.p.o. Concern for GI bleed at [facility] 6/3-. Last EGD  (on Care Everywhere). Acute hypoxic respiratory failure, resolved  1.4 cm incidental thyroid nodule-seen on CT scan. Will need outpatient follow. Slightly elevated troponin-EKG shows A-fib. Likely demand ischemia, no concern for ACS for now.
V2.0  [facility] Hospitalist Progress Note      Name:  Sienna Norris /Age/Sex: 1934  (80 y.o. female)   MRN & CSN:  [id number] & [id number] Encounter Date/Time: 2023 2:51 PM EDT    Location:  4903/2426-V PCP: [address] [place] [address] Day: 4    Assessment and Plan:   Sienna Norris is a 80 y.o. female who presents with right-sided weakness and aphasia due to CVA      Plan:  Acute left frontal CVA-seen on MRI brain. Due to size of stroke, neurology recommends to hold off on anticoagulation/antiplatelets for now. Dysphagia 2/2 above - patient has been NPO since admission. Discussed about NG tube - family agreeable, to be placed today and started on tube feed. GJ anastomotic ulcer, hx of gastric bypass surgery - on PPI twice daily for 8 to 12 weeks. Cannot give Carafate at this time due to patient being n.p.o. Concern for GI bleed at [facility] 6/3-. Last EGD  (on Care Everywhere). Fever-Tmax 101. Possible aspiration post stroke. CXR only shows cardiomegaly with pulmonary vascular congestion. On broad-spectrum antibiotics. Checking blood cultures. Respiratory PCR negative. Check respiratory culture. Acute hypoxic respiratory failure, resolved  1.4 cm incidental thyroid nodule-seen on CT scan. Will need outpatient follow. Slightly elevated troponin-EKG shows A-fib. Anemia-hemoglobin 7.4. Recent GI bleed at [facility]. A-fib-May still need watchman versus anticoagulation. Diet Diet NPO    DVT Prophylaxis [x] Lovenox, [] Heparin, [] Eliquis, [] Xarelto  [] SCDs       Code Status Full Code   Disposition From: Home  Expected Disposition: TBD  Estimated Date of Discharge:   Patient requires continued admission due to stroke         Personally reviewed Lab Studies and Imaging         Subjective/Interval history:   Chief Complaint: Altered Mental Status     As per family patient is doing a little better today  Moving right leg a little more.    NG tube to
When reevaluating pt for tolerance of continuous tube feedings this RN noted pt to have small amount of thick, frothy sputum consistent with tube feeds. Tube feed paused at this time and hospitalist contacted. Per hospitalist hold tube feeding at this time.
Xray/CT of Chest: see chart    Date of Eval: 6/12/2023  Evaluating Therapist: JULIANN Barreto    Current Diet level:  Current Diet : NPO      Primary Complaint  Patient Complaint: does not state/aphasia    Pain:       Reason for Referral  Peng Groves was referred for a bedside swallow evaluation to assess the efficiency of her swallow function, identify signs and symptoms of aspiration and make recommendations regarding safe dietary consistencies, effective compensatory strategies, and safe eating environment. Impression  Dysphagia Diagnosis: Severe oral stage dysphagia; Moderate pharyngeal stage dysphagia; Suspected needs further assessment  Dysphagia Outcome Severity Scale: Level 1: Severe dysphagia- NPO. Unable to tolerate any PO safely     Treatment Plan  Requires SLP Intervention: Yes  Duration of Treatment: LOS  D/C Recommendations: Ongoing speech therapy is recommended during this hospitalization; To be determined       Recommended Diet and Intervention        Recommended Form of Meds: Via alternative means of nutrition  Recommendations: NPO  Therapeutic Interventions: Patient/Family education; Therapeutic PO trials with SLP;Other (comment) (ongoing clinical swallow evaluation and instrumental evaluation as indicated)    Compensatory Swallowing Strategies       Treatment/Goals  Short-term Goals  Timeframe for Short-term Goals: LOS  Goal 1: Pt will participate in ongoing clinical swallow evaluation to determine readiness for diet initiation. Goal 2: Pt will participate in instrumental swallow evaluation as clinically indicated. Goal 3: Pt will participate in speech/language evaluation. Goal 4: Pt/caregivers will indicate understanding of education and recommendations.     General  Chart Reviewed: Yes  Behavior/Cognition: Alert  Respiratory Status: Room air  O2 Device: None (Room air)  Communication Observation: Aphasia  Follows Directions: None  Dentition: Edentulous  Patient Positioning: Upright in
continued assessment for possible diet initiation/instrumental swallow evaluation, continued tx to address speech and language deficits; see goals below. D/w amily at bedside, who indicate understanding/agreement. GOALS (current status in bold):  Short-term Goals  Timeframe for Short-term Goals: LOS  Goal 1: Pt will participate in ongoing clinical swallow evaluation to determine readiness for diet initiation. Targeted, not met, continue  Goal 2: Pt will participate in instrumental swallow evaluation as clinically indicated. Deferred d/t current severity of impairments  Goal 3: Pt will participate in speech/language evaluation. Goal met  New goal: Pt will respond verbally or nonverbally to yes/no questions with >50% accurately given repetitions. New goal: Pt will identified objects in a FO2 with >50% accuracy given increased time and repetitions. New goal: Pt will repeat 1-syllable words in 3/5 opportunities given increased time and repetitions. Goal 4: Pt/caregivers will indicate understanding of education and recommendations.  Meeting, d/w family          EDUCATION: as above    PAIN RATING (0-10 Scale): pt unable to rate; family reports pt has abdominal pain, RN aware  Time in/Time out: SLP Individual Minutes  Time In: 8211  Time Out: 0930  Minutes: 20    Visit number: [id number], SLP  6/13/2023  11:04 AM
unknown accuracy. She inconsistently followed basic commands given mod verbal/visual/tactile cues. Recommend continued assessment for possible diet initiation/instrumental swallow evaluation, continued tx to address speech and language deficits; see goals below. D/w family at bedside, who indicate understanding/agreement. GOALS (current status in bold):  Short-term Goals  Timeframe for Short-term Goals: LOS  Goal 1: Pt will participate in ongoing clinical swallow evaluation to determine readiness for diet initiation. Targeted, not met, continue  Goal 2: Pt will participate in instrumental swallow evaluation as clinically indicated. Deferred d/t current severity of impairments  Goal 3: Pt will respond verbally or nonverbally to yes/no questions with >50% accurately given repetitions. Targeted, responded to 1/2 objective questions accurately  Goal 4: Pt will identified objects in a FO2 with >50% accuracy given increased time and repetitions. 0 given models, repetitions; [place] assistance provided  Goal 5: Pt will repeat 1-syllable words in 3/5 opportunities given increased time and repetitions. DNT  Goal 6: Pt/caregivers will indicate understanding of education and recommendations.  Meeting, d/w family          EDUCATION: as above    PAIN RATING (0-10 Scale): pt appears comfortable, unable to rate  Time in/Time out: SLP Individual Minutes  Time In: 1020  Time Out: 1100  Minutes: 40    Visit number: [address] MA CCC-SLP  6/15/2023  11:50 AM
Johanne Rivas, [address] [place] [address],   Contact: [phone]
PRN  labetalol, 10 mg, Q4H PRN  HYDROmorphone, 0.25 mg, Q4H PRN  potassium chloride, 40 mEq, PRN   Or  potassium alternative oral replacement, 40 mEq, PRN   Or  potassium chloride, 10 mEq, PRN  magnesium sulfate, 2,000 mg, PRN  ondansetron, 4 mg, Q8H PRN   Or  ondansetron, 4 mg, Q6H PRN  polyethylene glycol, 17 g, Daily PRN  acetaminophen, 650 mg, Q4H PRN        Labs      Recent Labs     06/12/23  0330 06/12/23  1231 06/13/23  0143   WBC 8.8 8.7 7.8   HGB 7.4* 8.4* 7.9*   HCT 27.3* 31.4* 32.3*    381 361        Recent Labs     06/12/23  1231 06/13/23  0143 06/14/23  0014    142 144   K 4.0 3.9 3.5    105 105   CO2 27 25 26   PHOS  --   --  3.4   BUN 21 19 15   CREATININE 0.8 0.7 0.7       Recent Labs     06/12/23  0330 06/12/23  1231 06/13/23  0143   AST 16 22 22   ALT 13 13 11   BILITOT 0.2 0.2 0.2   ALKPHOS 148* 151* 137*       No results for input(s): INR in the last 72 hours.   Recent Labs     06/11/23  0959   TROPONINT 0.016*       No results found for: LABA1C  CALCIUM:  8.0/26 (06/14 0014)  Lab Results   Component Value Date/Time    MG 1.9 06/14/2023 12:14 AM           Electronically signed by Steve Roe MD on 6/14/2023 at 8:44 AM
atorvastatin  40 mg Oral Nightly         hydrALAZINE, labetalol, HYDROmorphone, potassium chloride **OR** potassium alternative oral replacement **OR** potassium chloride, magnesium sulfate, ondansetron **OR** ondansetron, polyethylene glycol, acetaminophen    Lab Data:  CBC:   Recent Labs     06/12/23  0330 06/12/23  1231 06/13/23  0143   WBC 8.8 8.7 7.8   HGB 7.4* 8.4* 7.9*   HCT 27.3* 31.4* 32.3*   MCV 86.9 90.5 96.1    381 361     BMP:   Recent Labs     06/13/23  0143 06/14/23  0014 06/14/23  1632    144 142   K 3.9 3.5 3.4*    105 101   CO2 25 26 28   PHOS  --  3.4 3.6   BUN 19 15 17   CREATININE 0.7 0.7 0.8     PT/INR: No results for input(s): PROTIME, INR in the last 72 hours. BNP:    Recent Labs     06/12/23  0330   PROBNP 6,899*     TROPONIN:   No results for input(s): TROPONINT in the last 72 hours. ECHO :   echocardiogram    Assessment:  80 y. o.year old who is admitted for  Chief Complaint   Patient presents with    Altered Mental Status    , active issues as noted below:  Impression:  Principal Problem:    Change in mental status  Active Problems:    Cerebrovascular accident (CVA) due to embolism ([facility]    Persistent atrial fibrillation ([facility]    Severe malnutrition ([facility]  Resolved Problems:    * No resolved hospital problems. *            All labs, medications and tests reviewed by myself , continue all other medications of all above medical condition listed as is except for changes mentioned above. Thank you very much for consult , please call with questions.     Sierra Segundo MD, MD 6/14/2023 6:29 PM
minutes: 25 minutes  Total time: 30 minutes    Electronically signed by:    Noreen FUNES/L [id number]  8:20 AM,6/13/2023

## 2023-06-17 PROBLEM — I50.32 CHRONIC DIASTOLIC (CONGESTIVE) HEART FAILURE (HCC): Status: ACTIVE | Noted: 2023-06-17

## 2023-06-17 PROBLEM — D62 ACUTE BLOOD LOSS ANEMIA: Status: ACTIVE | Noted: 2023-06-17

## 2023-06-17 PROBLEM — K27.9 PEPTIC ULCER DISEASE: Status: ACTIVE | Noted: 2023-06-17

## 2023-06-17 PROBLEM — I63.9 APHASIA DUE TO ACUTE STROKE (HCC): Status: ACTIVE | Noted: 2023-06-17

## 2023-06-17 LAB
ANION GAP SERPL CALCULATED.3IONS-SCNC: 13 MMOL/L (ref 4–16)
BUN SERPL-MCNC: 16 MG/DL (ref 6–23)
CALCIUM SERPL-MCNC: 8.2 MG/DL (ref 8.3–10.6)
CHLORIDE BLD-SCNC: 103 MMOL/L (ref 99–110)
CO2: 28 MMOL/L (ref 21–32)
CREAT SERPL-MCNC: 0.6 MG/DL (ref 0.6–1.1)
CULTURE: NORMAL
CULTURE: NORMAL
GFR SERPL CREATININE-BSD FRML MDRD: >60 ML/MIN/1.73M2
GLUCOSE SERPL-MCNC: 175 MG/DL (ref 70–99)
Lab: NORMAL
Lab: NORMAL
MAGNESIUM: 2.1 MG/DL (ref 1.8–2.4)
PHOSPHORUS: 2.3 MG/DL (ref 2.5–4.9)
POTASSIUM SERPL-SCNC: 3.3 MMOL/L (ref 3.5–5.1)
SODIUM BLD-SCNC: 144 MMOL/L (ref 135–145)
SPECIMEN: NORMAL
SPECIMEN: NORMAL

## 2023-06-17 PROCEDURE — 36415 COLL VENOUS BLD VENIPUNCTURE: CPT

## 2023-06-17 PROCEDURE — 80048 BASIC METABOLIC PNL TOTAL CA: CPT

## 2023-06-17 PROCEDURE — 84100 ASSAY OF PHOSPHORUS: CPT

## 2023-06-17 PROCEDURE — 6370000000 HC RX 637 (ALT 250 FOR IP): Performed by: PHYSICAL MEDICINE & REHABILITATION

## 2023-06-17 PROCEDURE — 97166 OT EVAL MOD COMPLEX 45 MIN: CPT

## 2023-06-17 PROCEDURE — 97530 THERAPEUTIC ACTIVITIES: CPT

## 2023-06-17 PROCEDURE — 6360000002 HC RX W HCPCS: Performed by: PHYSICAL MEDICINE & REHABILITATION

## 2023-06-17 PROCEDURE — C9113 INJ PANTOPRAZOLE SODIUM, VIA: HCPCS | Performed by: INTERNAL MEDICINE

## 2023-06-17 PROCEDURE — 6360000002 HC RX W HCPCS: Performed by: INTERNAL MEDICINE

## 2023-06-17 PROCEDURE — 97535 SELF CARE MNGMENT TRAINING: CPT

## 2023-06-17 PROCEDURE — 97116 GAIT TRAINING THERAPY: CPT

## 2023-06-17 PROCEDURE — 83735 ASSAY OF MAGNESIUM: CPT

## 2023-06-17 PROCEDURE — 1280000000 HC REHAB R&B

## 2023-06-17 PROCEDURE — 97163 PT EVAL HIGH COMPLEX 45 MIN: CPT

## 2023-06-17 PROCEDURE — 94761 N-INVAS EAR/PLS OXIMETRY MLT: CPT

## 2023-06-17 RX ORDER — LORAZEPAM 0.5 MG/1
0.5 TABLET ORAL ONCE
Status: COMPLETED | OUTPATIENT
Start: 2023-06-17 | End: 2023-06-17

## 2023-06-17 RX ADMIN — POTASSIUM BICARBONATE 40 MEQ: 782 TABLET, EFFERVESCENT ORAL at 08:03

## 2023-06-17 RX ADMIN — ACETAMINOPHEN 650 MG: 325 TABLET ORAL at 11:59

## 2023-06-17 RX ADMIN — ACETAMINOPHEN 650 MG: 325 TABLET ORAL at 08:02

## 2023-06-17 RX ADMIN — HYDROMORPHONE HYDROCHLORIDE 0.25 MG: 1 INJECTION, SOLUTION INTRAMUSCULAR; INTRAVENOUS; SUBCUTANEOUS at 22:39

## 2023-06-17 RX ADMIN — FUROSEMIDE 40 MG: 40 TABLET ORAL at 08:02

## 2023-06-17 RX ADMIN — ONDANSETRON 4 MG: 4 TABLET, ORALLY DISINTEGRATING ORAL at 17:14

## 2023-06-17 RX ADMIN — APIXABAN 5 MG: 5 TABLET, FILM COATED ORAL at 08:02

## 2023-06-17 RX ADMIN — POTASSIUM BICARBONATE 40 MEQ: 782 TABLET, EFFERVESCENT ORAL at 14:12

## 2023-06-17 RX ADMIN — AMLODIPINE BESYLATE 5 MG: 5 TABLET ORAL at 08:03

## 2023-06-17 RX ADMIN — HYDROMORPHONE HYDROCHLORIDE 0.25 MG: 1 INJECTION, SOLUTION INTRAMUSCULAR; INTRAVENOUS; SUBCUTANEOUS at 03:40

## 2023-06-17 RX ADMIN — ONDANSETRON 4 MG: 2 INJECTION INTRAMUSCULAR; INTRAVENOUS at 22:40

## 2023-06-17 RX ADMIN — POTASSIUM BICARBONATE 40 MEQ: 782 TABLET, EFFERVESCENT ORAL at 21:26

## 2023-06-17 RX ADMIN — APIXABAN 5 MG: 5 TABLET, FILM COATED ORAL at 21:26

## 2023-06-17 RX ADMIN — ASPIRIN 81 MG 81 MG: 81 TABLET ORAL at 08:02

## 2023-06-17 RX ADMIN — AMOXICILLIN AND CLAVULANATE POTASSIUM 1 TABLET: 875; 125 TABLET, FILM COATED ORAL at 08:02

## 2023-06-17 RX ADMIN — ATORVASTATIN CALCIUM 40 MG: 40 TABLET, FILM COATED ORAL at 21:26

## 2023-06-17 RX ADMIN — AMOXICILLIN AND CLAVULANATE POTASSIUM 1 TABLET: 875; 125 TABLET, FILM COATED ORAL at 21:26

## 2023-06-17 RX ADMIN — PANTOPRAZOLE SODIUM 40 MG: 40 INJECTION, POWDER, FOR SOLUTION INTRAVENOUS at 21:26

## 2023-06-17 RX ADMIN — PANTOPRAZOLE SODIUM 40 MG: 40 INJECTION, POWDER, FOR SOLUTION INTRAVENOUS at 10:28

## 2023-06-17 RX ADMIN — LORAZEPAM 0.5 MG: 0.5 TABLET ORAL at 08:41

## 2023-06-17 ASSESSMENT — PAIN - FUNCTIONAL ASSESSMENT
PAIN_FUNCTIONAL_ASSESSMENT: PREVENTS OR INTERFERES SOME ACTIVE ACTIVITIES AND ADLS

## 2023-06-17 ASSESSMENT — PAIN SCALES - WONG BAKER
WONGBAKER_NUMERICALRESPONSE: 0
WONGBAKER_NUMERICALRESPONSE: 4
WONGBAKER_NUMERICALRESPONSE: 6

## 2023-06-17 ASSESSMENT — PAIN DESCRIPTION - ORIENTATION
ORIENTATION: OTHER (COMMENT)
ORIENTATION: RIGHT;LEFT

## 2023-06-17 ASSESSMENT — PAIN DESCRIPTION - ONSET
ONSET: ON-GOING
ONSET: ON-GOING

## 2023-06-17 ASSESSMENT — PAIN SCALES - GENERAL
PAINLEVEL_OUTOF10: 0
PAINLEVEL_OUTOF10: 0
PAINLEVEL_OUTOF10: 6

## 2023-06-17 ASSESSMENT — PAIN DESCRIPTION - PAIN TYPE
TYPE: CHRONIC PAIN
TYPE: ACUTE PAIN

## 2023-06-17 ASSESSMENT — PAIN DESCRIPTION - LOCATION
LOCATION: LEG
LOCATION: ABDOMEN
LOCATION: ABDOMEN

## 2023-06-17 ASSESSMENT — PAIN DESCRIPTION - FREQUENCY
FREQUENCY: INTERMITTENT
FREQUENCY: CONTINUOUS

## 2023-06-17 ASSESSMENT — PAIN DESCRIPTION - DESCRIPTORS
DESCRIPTORS: PATIENT UNABLE TO DESCRIBE
DESCRIPTORS: PATIENT UNABLE TO DESCRIBE
DESCRIPTORS: ACHING

## 2023-06-18 LAB
ANION GAP SERPL CALCULATED.3IONS-SCNC: 16 MMOL/L (ref 4–16)
BUN SERPL-MCNC: 18 MG/DL (ref 6–23)
CALCIUM SERPL-MCNC: 7.9 MG/DL (ref 8.3–10.6)
CHLORIDE BLD-SCNC: 102 MMOL/L (ref 99–110)
CO2: 25 MMOL/L (ref 21–32)
CREAT SERPL-MCNC: 0.6 MG/DL (ref 0.6–1.1)
GFR SERPL CREATININE-BSD FRML MDRD: >60 ML/MIN/1.73M2
GLUCOSE SERPL-MCNC: 207 MG/DL (ref 70–99)
MAGNESIUM: 2.2 MG/DL (ref 1.8–2.4)
PHOSPHORUS: 2.2 MG/DL (ref 2.5–4.9)
POTASSIUM SERPL-SCNC: 4.9 MMOL/L (ref 3.5–5.1)
SODIUM BLD-SCNC: 143 MMOL/L (ref 135–145)

## 2023-06-18 PROCEDURE — 94761 N-INVAS EAR/PLS OXIMETRY MLT: CPT

## 2023-06-18 PROCEDURE — 6360000002 HC RX W HCPCS: Performed by: INTERNAL MEDICINE

## 2023-06-18 PROCEDURE — 36415 COLL VENOUS BLD VENIPUNCTURE: CPT

## 2023-06-18 PROCEDURE — 1280000000 HC REHAB R&B

## 2023-06-18 PROCEDURE — 6370000000 HC RX 637 (ALT 250 FOR IP): Performed by: PHYSICAL MEDICINE & REHABILITATION

## 2023-06-18 PROCEDURE — 83735 ASSAY OF MAGNESIUM: CPT

## 2023-06-18 PROCEDURE — C9113 INJ PANTOPRAZOLE SODIUM, VIA: HCPCS | Performed by: INTERNAL MEDICINE

## 2023-06-18 PROCEDURE — 84100 ASSAY OF PHOSPHORUS: CPT

## 2023-06-18 PROCEDURE — 80048 BASIC METABOLIC PNL TOTAL CA: CPT

## 2023-06-18 RX ADMIN — POTASSIUM BICARBONATE 40 MEQ: 782 TABLET, EFFERVESCENT ORAL at 20:39

## 2023-06-18 RX ADMIN — AMOXICILLIN AND CLAVULANATE POTASSIUM 1 TABLET: 875; 125 TABLET, FILM COATED ORAL at 20:39

## 2023-06-18 RX ADMIN — PANTOPRAZOLE SODIUM 40 MG: 40 INJECTION, POWDER, FOR SOLUTION INTRAVENOUS at 10:10

## 2023-06-18 RX ADMIN — HYDROMORPHONE HYDROCHLORIDE 0.25 MG: 1 INJECTION, SOLUTION INTRAMUSCULAR; INTRAVENOUS; SUBCUTANEOUS at 10:09

## 2023-06-18 RX ADMIN — PANTOPRAZOLE SODIUM 40 MG: 40 INJECTION, POWDER, FOR SOLUTION INTRAVENOUS at 20:38

## 2023-06-18 RX ADMIN — ONDANSETRON 4 MG: 4 TABLET, ORALLY DISINTEGRATING ORAL at 19:18

## 2023-06-18 RX ADMIN — ASPIRIN 81 MG 81 MG: 81 TABLET ORAL at 07:49

## 2023-06-18 RX ADMIN — POTASSIUM BICARBONATE 40 MEQ: 782 TABLET, EFFERVESCENT ORAL at 07:47

## 2023-06-18 RX ADMIN — AMOXICILLIN AND CLAVULANATE POTASSIUM 1 TABLET: 875; 125 TABLET, FILM COATED ORAL at 07:47

## 2023-06-18 RX ADMIN — APIXABAN 5 MG: 5 TABLET, FILM COATED ORAL at 07:48

## 2023-06-18 RX ADMIN — FUROSEMIDE 40 MG: 40 TABLET ORAL at 07:48

## 2023-06-18 RX ADMIN — APIXABAN 5 MG: 5 TABLET, FILM COATED ORAL at 20:39

## 2023-06-18 RX ADMIN — HYDROMORPHONE HYDROCHLORIDE 0.25 MG: 1 INJECTION, SOLUTION INTRAMUSCULAR; INTRAVENOUS; SUBCUTANEOUS at 20:38

## 2023-06-18 RX ADMIN — AMLODIPINE BESYLATE 5 MG: 5 TABLET ORAL at 07:48

## 2023-06-18 RX ADMIN — ATORVASTATIN CALCIUM 40 MG: 40 TABLET, FILM COATED ORAL at 20:39

## 2023-06-18 ASSESSMENT — PAIN DESCRIPTION - ORIENTATION
ORIENTATION: LEFT
ORIENTATION: LEFT
ORIENTATION: MID

## 2023-06-18 ASSESSMENT — PAIN DESCRIPTION - DESCRIPTORS
DESCRIPTORS: ACHING

## 2023-06-18 ASSESSMENT — PAIN - FUNCTIONAL ASSESSMENT
PAIN_FUNCTIONAL_ASSESSMENT: PREVENTS OR INTERFERES SOME ACTIVE ACTIVITIES AND ADLS
PAIN_FUNCTIONAL_ASSESSMENT: PREVENTS OR INTERFERES SOME ACTIVE ACTIVITIES AND ADLS

## 2023-06-18 ASSESSMENT — PAIN DESCRIPTION - ONSET
ONSET: GRADUAL
ONSET: GRADUAL

## 2023-06-18 ASSESSMENT — PAIN DESCRIPTION - PAIN TYPE
TYPE: ACUTE PAIN
TYPE: ACUTE PAIN

## 2023-06-18 ASSESSMENT — PAIN SCALES - WONG BAKER
WONGBAKER_NUMERICALRESPONSE: 4
WONGBAKER_NUMERICALRESPONSE: 0
WONGBAKER_NUMERICALRESPONSE: 4

## 2023-06-18 ASSESSMENT — PAIN DESCRIPTION - FREQUENCY
FREQUENCY: INTERMITTENT
FREQUENCY: INTERMITTENT

## 2023-06-18 ASSESSMENT — PAIN SCALES - GENERAL: PAINLEVEL_OUTOF10: 5

## 2023-06-18 ASSESSMENT — PAIN DESCRIPTION - LOCATION
LOCATION: ARM;WRIST
LOCATION: ARM;WRIST
LOCATION: ABDOMEN

## 2023-06-19 ENCOUNTER — APPOINTMENT (OUTPATIENT)
Dept: GENERAL RADIOLOGY | Age: 88
DRG: 056 | End: 2023-06-19
Attending: PHYSICAL MEDICINE & REHABILITATION
Payer: MEDICARE

## 2023-06-19 LAB
ANION GAP SERPL CALCULATED.3IONS-SCNC: 11 MMOL/L (ref 4–16)
ANION GAP SERPL CALCULATED.3IONS-SCNC: 12 MMOL/L (ref 4–16)
BASOPHILS ABSOLUTE: 0 K/CU MM
BASOPHILS RELATIVE PERCENT: 0.2 % (ref 0–1)
BUN SERPL-MCNC: 19 MG/DL (ref 6–23)
BUN SERPL-MCNC: 22 MG/DL (ref 6–23)
CALCIUM SERPL-MCNC: 7.8 MG/DL (ref 8.3–10.6)
CALCIUM SERPL-MCNC: 7.8 MG/DL (ref 8.3–10.6)
CHLORIDE BLD-SCNC: 105 MMOL/L (ref 99–110)
CHLORIDE BLD-SCNC: 106 MMOL/L (ref 99–110)
CO2: 25 MMOL/L (ref 21–32)
CO2: 27 MMOL/L (ref 21–32)
CREAT SERPL-MCNC: 0.7 MG/DL (ref 0.6–1.1)
CREAT SERPL-MCNC: 0.7 MG/DL (ref 0.6–1.1)
DIFFERENTIAL TYPE: ABNORMAL
EOSINOPHILS ABSOLUTE: 0.1 K/CU MM
EOSINOPHILS RELATIVE PERCENT: 0.2 % (ref 0–3)
GFR SERPL CREATININE-BSD FRML MDRD: >60 ML/MIN/1.73M2
GFR SERPL CREATININE-BSD FRML MDRD: >60 ML/MIN/1.73M2
GLUCOSE SERPL-MCNC: 228 MG/DL (ref 70–99)
GLUCOSE SERPL-MCNC: 229 MG/DL (ref 70–99)
HCT VFR BLD CALC: 36.3 % (ref 37–47)
HCT VFR BLD CALC: 38 % (ref 37–47)
HEMOGLOBIN: 9.4 GM/DL (ref 12.5–16)
HEMOGLOBIN: 9.8 GM/DL (ref 12.5–16)
IMMATURE NEUTROPHIL %: 1.1 % (ref 0–0.43)
LYMPHOCYTES ABSOLUTE: 0.5 K/CU MM
LYMPHOCYTES RELATIVE PERCENT: 1.8 % (ref 24–44)
MCH RBC QN AUTO: 23.3 PG (ref 27–31)
MCH RBC QN AUTO: 23.7 PG (ref 27–31)
MCHC RBC AUTO-ENTMCNC: 25.8 % (ref 32–36)
MCHC RBC AUTO-ENTMCNC: 25.9 % (ref 32–36)
MCV RBC AUTO: 90.1 FL (ref 78–100)
MCV RBC AUTO: 92 FL (ref 78–100)
MONOCYTES ABSOLUTE: 1 K/CU MM
MONOCYTES RELATIVE PERCENT: 3.8 % (ref 0–4)
NUCLEATED RBC %: 0 %
PDW BLD-RTO: 16.6 % (ref 11.7–14.9)
PDW BLD-RTO: 16.7 % (ref 11.7–14.9)
PLATELET # BLD: 351 K/CU MM (ref 140–440)
PLATELET # BLD: 405 K/CU MM (ref 140–440)
PMV BLD AUTO: 10 FL (ref 7.5–11.1)
PMV BLD AUTO: 10.1 FL (ref 7.5–11.1)
POTASSIUM SERPL-SCNC: 5.1 MMOL/L (ref 3.5–5.1)
POTASSIUM SERPL-SCNC: 5.8 MMOL/L (ref 3.5–5.1)
RBC # BLD: 4.03 M/CU MM (ref 4.2–5.4)
RBC # BLD: 4.13 M/CU MM (ref 4.2–5.4)
SEGMENTED NEUTROPHILS ABSOLUTE COUNT: 24.7 K/CU MM
SEGMENTED NEUTROPHILS RELATIVE PERCENT: 92.9 % (ref 36–66)
SODIUM BLD-SCNC: 143 MMOL/L (ref 135–145)
SODIUM BLD-SCNC: 143 MMOL/L (ref 135–145)
TOTAL IMMATURE NEUTOROPHIL: 0.28 K/CU MM
TOTAL NUCLEATED RBC: 0 K/CU MM
WBC # BLD: 19.9 K/CU MM (ref 4–10.5)
WBC # BLD: 26.6 K/CU MM (ref 4–10.5)

## 2023-06-19 PROCEDURE — 71045 X-RAY EXAM CHEST 1 VIEW: CPT

## 2023-06-19 PROCEDURE — 97535 SELF CARE MNGMENT TRAINING: CPT

## 2023-06-19 PROCEDURE — 99233 SBSQ HOSP IP/OBS HIGH 50: CPT | Performed by: PHYSICAL MEDICINE & REHABILITATION

## 2023-06-19 PROCEDURE — 92610 EVALUATE SWALLOWING FUNCTION: CPT

## 2023-06-19 PROCEDURE — 97116 GAIT TRAINING THERAPY: CPT

## 2023-06-19 PROCEDURE — 36415 COLL VENOUS BLD VENIPUNCTURE: CPT

## 2023-06-19 PROCEDURE — 85027 COMPLETE CBC AUTOMATED: CPT

## 2023-06-19 PROCEDURE — 92523 SPEECH SOUND LANG COMPREHEN: CPT

## 2023-06-19 PROCEDURE — 80048 BASIC METABOLIC PNL TOTAL CA: CPT

## 2023-06-19 PROCEDURE — 97530 THERAPEUTIC ACTIVITIES: CPT

## 2023-06-19 PROCEDURE — 6360000002 HC RX W HCPCS: Performed by: INTERNAL MEDICINE

## 2023-06-19 PROCEDURE — 1280000000 HC REHAB R&B

## 2023-06-19 PROCEDURE — 6370000000 HC RX 637 (ALT 250 FOR IP): Performed by: PHYSICAL MEDICINE & REHABILITATION

## 2023-06-19 PROCEDURE — 6360000002 HC RX W HCPCS: Performed by: PHYSICAL MEDICINE & REHABILITATION

## 2023-06-19 PROCEDURE — 94761 N-INVAS EAR/PLS OXIMETRY MLT: CPT

## 2023-06-19 PROCEDURE — C9113 INJ PANTOPRAZOLE SODIUM, VIA: HCPCS | Performed by: INTERNAL MEDICINE

## 2023-06-19 PROCEDURE — 2580000003 HC RX 258: Performed by: PHYSICAL MEDICINE & REHABILITATION

## 2023-06-19 PROCEDURE — 85025 COMPLETE CBC W/AUTO DIFF WBC: CPT

## 2023-06-19 RX ORDER — 0.9 % SODIUM CHLORIDE 0.9 %
500 INTRAVENOUS SOLUTION INTRAVENOUS ONCE
Status: COMPLETED | OUTPATIENT
Start: 2023-06-19 | End: 2023-06-19

## 2023-06-19 RX ADMIN — SODIUM CHLORIDE 500 ML: 9 INJECTION, SOLUTION INTRAVENOUS at 07:00

## 2023-06-19 RX ADMIN — HYDROMORPHONE HYDROCHLORIDE 0.25 MG: 1 INJECTION, SOLUTION INTRAMUSCULAR; INTRAVENOUS; SUBCUTANEOUS at 17:26

## 2023-06-19 RX ADMIN — ASPIRIN 81 MG 81 MG: 81 TABLET ORAL at 07:42

## 2023-06-19 RX ADMIN — APIXABAN 5 MG: 5 TABLET, FILM COATED ORAL at 07:43

## 2023-06-19 RX ADMIN — PANTOPRAZOLE SODIUM 40 MG: 40 INJECTION, POWDER, FOR SOLUTION INTRAVENOUS at 23:11

## 2023-06-19 RX ADMIN — CEFEPIME 2000 MG: 2 INJECTION, POWDER, FOR SOLUTION INTRAVENOUS at 20:28

## 2023-06-19 RX ADMIN — PANTOPRAZOLE SODIUM 40 MG: 40 INJECTION, POWDER, FOR SOLUTION INTRAVENOUS at 09:59

## 2023-06-19 RX ADMIN — APIXABAN 5 MG: 5 TABLET, FILM COATED ORAL at 23:02

## 2023-06-19 RX ADMIN — AMLODIPINE BESYLATE 5 MG: 5 TABLET ORAL at 07:42

## 2023-06-19 RX ADMIN — FUROSEMIDE 40 MG: 40 TABLET ORAL at 07:42

## 2023-06-19 RX ADMIN — ONDANSETRON 4 MG: 4 TABLET, ORALLY DISINTEGRATING ORAL at 03:43

## 2023-06-19 RX ADMIN — CEFEPIME 2000 MG: 2 INJECTION, POWDER, FOR SOLUTION INTRAVENOUS at 07:01

## 2023-06-19 RX ADMIN — ATORVASTATIN CALCIUM 40 MG: 40 TABLET, FILM COATED ORAL at 23:02

## 2023-06-19 RX ADMIN — HYDROMORPHONE HYDROCHLORIDE 0.25 MG: 1 INJECTION, SOLUTION INTRAMUSCULAR; INTRAVENOUS; SUBCUTANEOUS at 06:02

## 2023-06-19 ASSESSMENT — PAIN SCALES - WONG BAKER
WONGBAKER_NUMERICALRESPONSE: 0

## 2023-06-19 ASSESSMENT — PAIN SCALES - GENERAL: PAINLEVEL_OUTOF10: 5

## 2023-06-19 ASSESSMENT — PAIN DESCRIPTION - ORIENTATION: ORIENTATION: LEFT;RIGHT

## 2023-06-19 ASSESSMENT — PAIN - FUNCTIONAL ASSESSMENT: PAIN_FUNCTIONAL_ASSESSMENT: PREVENTS OR INTERFERES SOME ACTIVE ACTIVITIES AND ADLS

## 2023-06-19 ASSESSMENT — PAIN DESCRIPTION - ONSET: ONSET: GRADUAL

## 2023-06-19 ASSESSMENT — PAIN DESCRIPTION - FREQUENCY: FREQUENCY: INTERMITTENT

## 2023-06-19 ASSESSMENT — PAIN DESCRIPTION - DESCRIPTORS: DESCRIPTORS: ACHING

## 2023-06-19 ASSESSMENT — PAIN DESCRIPTION - LOCATION
LOCATION: OTHER (COMMENT)
LOCATION: ABDOMEN;LEG

## 2023-06-19 ASSESSMENT — PAIN DESCRIPTION - PAIN TYPE: TYPE: ACUTE PAIN

## 2023-06-19 NOTE — FLOWSHEET NOTE
06/19/23 0156   Output (mL)   Urine   (incont with blood/and clots)   Urine Assessment   Urinary Status Voiding   Urinary Incontinence Present   Urine Color Yellow/straw  (with blood/clots from rectum in attends)   Urine Appearance HUBER   Urine Odor No odor   Unmeasured Output   Urine Occurrence 1   Stool Occurrence   (bleeding/clots from rectum)

## 2023-06-19 NOTE — PATIENT CARE CONFERENCE
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 ([facility]) [I69.351] 2023    Dysphagia due to recent stroke [I69.391] 2023    Paroxysmal atrial fibrillation (HCC) [I48.0] 2023    Chronic diastolic (congestive) heart failure (HCC) [I50.32] 2023    Acute blood loss anemia [D62] 2023    Peptic ulcer disease [K27.9] 2023    Aspiration pneumonia of both lungs due to regurgitated food ([facility]) [J69.0] 2023    Aphasia due to acute stroke ([facility]) [I63.9, R47.01] 2023    Dysarthria due to acute stroke ([facility]) [I63.9, R47.1] 2023    Acute CVA (cerebrovascular accident) [facility]) [I63.9] 2023     Date: 2023    CASE MANAGEMENT  Current issues/needs regarding patient and family discharge status:   patient may need more assist at discharge than family can provided  Patient and family goal:   dc home with son and his spouse. Be able to ambulate to bathroom and toilet independently. PHYSICAL THERAPY (Updated in QI)  Short Term Goals  Time Frame for Short Term Goals: 10-14 days, STG=LTG  Short Term Goal 1: pt to complete all bed mobility mod I  Short Term Goal 2: Pt will perform sit <>stand, chair<>bed and car transfers with supervision  Short Term Goal 3: Pt will ambulate with 2ww 50' on level surface with CGA and 150' on level surface/10'unlevel surface with min assist  Short Term Goal 4: Pt will ascend/descend curb step with 2ww and up to 12 steps with rails with CGA  Short Term Goal 5: Pt will  object on floor with device and reacher with CGA          Impairments/deficits, barriers:     Body Structures, Functions,

## 2023-06-19 NOTE — PLAN OF CARE
Problem: Discharge Planning  Goal: Discharge to home or other facility with appropriate resources  6/19/2023 0932 by Erika Valdes LPN  Outcome: Progressing  6/18/2023 2226 by Cj Clayton RN  Outcome: Progressing     Problem: Safety - Adult  Goal: Free from fall injury  6/18/2023 2226 by Cj Clayton RN  Outcome: Progressing     Problem: Pain  Goal: Verbalizes/displays adequate comfort level or baseline comfort level  6/18/2023 2226 by Cj Clayton RN  Outcome: Progressing     Problem: Skin/Tissue Integrity  Goal: Absence of new skin breakdown  Description: 1. Monitor for areas of redness and/or skin breakdown  2. Assess vascular access sites hourly  3. Every 4-6 hours minimum:  Change oxygen saturation probe site  4. Every 4-6 hours:  If on nasal continuous positive airway pressure, respiratory therapy assess nares and determine need for appliance change or resting period.   6/18/2023 2226 by Cj Clayton RN  Outcome: Progressing     Problem: ABCDS Injury Assessment  Goal: Absence of physical injury  6/18/2023 2226 by Cj Clayton RN  Outcome: Progressing

## 2023-06-20 LAB
ANION GAP SERPL CALCULATED.3IONS-SCNC: 10 MMOL/L (ref 4–16)
BUN SERPL-MCNC: 25 MG/DL (ref 6–23)
CALCIUM SERPL-MCNC: 7.8 MG/DL (ref 8.3–10.6)
CHLORIDE BLD-SCNC: 98 MMOL/L (ref 99–110)
CO2: 28 MMOL/L (ref 21–32)
CREAT SERPL-MCNC: 0.7 MG/DL (ref 0.6–1.1)
GFR SERPL CREATININE-BSD FRML MDRD: >60 ML/MIN/1.73M2
GLUCOSE BLD-MCNC: 242 MG/DL (ref 70–99)
GLUCOSE SERPL-MCNC: 276 MG/DL (ref 70–99)
POTASSIUM SERPL-SCNC: 4.7 MMOL/L (ref 3.5–5.1)
REASON FOR REJECTION: NORMAL
REJECTED TEST: NORMAL
SODIUM BLD-SCNC: 136 MMOL/L (ref 135–145)

## 2023-06-20 PROCEDURE — 36415 COLL VENOUS BLD VENIPUNCTURE: CPT

## 2023-06-20 PROCEDURE — 97116 GAIT TRAINING THERAPY: CPT

## 2023-06-20 PROCEDURE — 92507 TX SP LANG VOICE COMM INDIV: CPT

## 2023-06-20 PROCEDURE — 97530 THERAPEUTIC ACTIVITIES: CPT

## 2023-06-20 PROCEDURE — 2580000003 HC RX 258: Performed by: PHYSICAL MEDICINE & REHABILITATION

## 2023-06-20 PROCEDURE — 6370000000 HC RX 637 (ALT 250 FOR IP): Performed by: INTERNAL MEDICINE

## 2023-06-20 PROCEDURE — 1280000000 HC REHAB R&B

## 2023-06-20 PROCEDURE — 6360000002 HC RX W HCPCS: Performed by: INTERNAL MEDICINE

## 2023-06-20 PROCEDURE — 94761 N-INVAS EAR/PLS OXIMETRY MLT: CPT

## 2023-06-20 PROCEDURE — 97535 SELF CARE MNGMENT TRAINING: CPT

## 2023-06-20 PROCEDURE — 80048 BASIC METABOLIC PNL TOTAL CA: CPT

## 2023-06-20 PROCEDURE — 94640 AIRWAY INHALATION TREATMENT: CPT

## 2023-06-20 PROCEDURE — 6360000002 HC RX W HCPCS: Performed by: PHYSICAL MEDICINE & REHABILITATION

## 2023-06-20 PROCEDURE — 6370000000 HC RX 637 (ALT 250 FOR IP): Performed by: PHYSICAL MEDICINE & REHABILITATION

## 2023-06-20 PROCEDURE — 82962 GLUCOSE BLOOD TEST: CPT

## 2023-06-20 PROCEDURE — C9113 INJ PANTOPRAZOLE SODIUM, VIA: HCPCS | Performed by: INTERNAL MEDICINE

## 2023-06-20 PROCEDURE — 99233 SBSQ HOSP IP/OBS HIGH 50: CPT | Performed by: PHYSICAL MEDICINE & REHABILITATION

## 2023-06-20 RX ORDER — LORAZEPAM 0.5 MG/1
0.25 TABLET ORAL 2 TIMES DAILY
Status: DISCONTINUED | OUTPATIENT
Start: 2023-06-20 | End: 2023-06-29 | Stop reason: HOSPADM

## 2023-06-20 RX ORDER — IPRATROPIUM BROMIDE AND ALBUTEROL SULFATE 2.5; .5 MG/3ML; MG/3ML
1 SOLUTION RESPIRATORY (INHALATION)
Status: DISCONTINUED | OUTPATIENT
Start: 2023-06-20 | End: 2023-06-21

## 2023-06-20 RX ORDER — HYDROCORTISONE ACETATE 25 MG/1
25 SUPPOSITORY RECTAL 2 TIMES DAILY
Status: DISCONTINUED | OUTPATIENT
Start: 2023-06-20 | End: 2023-06-28

## 2023-06-20 RX ADMIN — HYDROMORPHONE HYDROCHLORIDE 0.25 MG: 1 INJECTION, SOLUTION INTRAMUSCULAR; INTRAVENOUS; SUBCUTANEOUS at 15:56

## 2023-06-20 RX ADMIN — IPRATROPIUM BROMIDE AND ALBUTEROL SULFATE 1 DOSE: 2.5; .5 SOLUTION RESPIRATORY (INHALATION) at 18:40

## 2023-06-20 RX ADMIN — PANTOPRAZOLE SODIUM 40 MG: 40 INJECTION, POWDER, FOR SOLUTION INTRAVENOUS at 20:26

## 2023-06-20 RX ADMIN — HYDROCORTISONE ACETATE 25 MG: 25 SUPPOSITORY RECTAL at 23:52

## 2023-06-20 RX ADMIN — APIXABAN 5 MG: 5 TABLET, FILM COATED ORAL at 20:26

## 2023-06-20 RX ADMIN — AMLODIPINE BESYLATE 5 MG: 5 TABLET ORAL at 09:39

## 2023-06-20 RX ADMIN — LORAZEPAM 0.25 MG: 0.5 TABLET ORAL at 17:45

## 2023-06-20 RX ADMIN — HYDROMORPHONE HYDROCHLORIDE 0.25 MG: 1 INJECTION, SOLUTION INTRAMUSCULAR; INTRAVENOUS; SUBCUTANEOUS at 20:24

## 2023-06-20 RX ADMIN — PANTOPRAZOLE SODIUM 40 MG: 40 INJECTION, POWDER, FOR SOLUTION INTRAVENOUS at 09:38

## 2023-06-20 RX ADMIN — ONDANSETRON 4 MG: 4 TABLET, ORALLY DISINTEGRATING ORAL at 02:15

## 2023-06-20 RX ADMIN — IPRATROPIUM BROMIDE AND ALBUTEROL SULFATE 1 DOSE: 2.5; .5 SOLUTION RESPIRATORY (INHALATION) at 12:29

## 2023-06-20 RX ADMIN — ONDANSETRON 4 MG: 2 INJECTION INTRAMUSCULAR; INTRAVENOUS at 16:07

## 2023-06-20 RX ADMIN — HYDROMORPHONE HYDROCHLORIDE 0.25 MG: 1 INJECTION, SOLUTION INTRAMUSCULAR; INTRAVENOUS; SUBCUTANEOUS at 04:45

## 2023-06-20 RX ADMIN — ATORVASTATIN CALCIUM 40 MG: 40 TABLET, FILM COATED ORAL at 20:26

## 2023-06-20 RX ADMIN — IPRATROPIUM BROMIDE AND ALBUTEROL SULFATE 1 DOSE: 2.5; .5 SOLUTION RESPIRATORY (INHALATION) at 15:34

## 2023-06-20 RX ADMIN — ASPIRIN 81 MG 81 MG: 81 TABLET ORAL at 09:38

## 2023-06-20 RX ADMIN — CEFEPIME 2000 MG: 2 INJECTION, POWDER, FOR SOLUTION INTRAVENOUS at 20:29

## 2023-06-20 RX ADMIN — APIXABAN 5 MG: 5 TABLET, FILM COATED ORAL at 09:38

## 2023-06-20 RX ADMIN — CEFEPIME 2000 MG: 2 INJECTION, POWDER, FOR SOLUTION INTRAVENOUS at 10:07

## 2023-06-20 RX ADMIN — HYDROMORPHONE HYDROCHLORIDE 0.25 MG: 1 INJECTION, SOLUTION INTRAMUSCULAR; INTRAVENOUS; SUBCUTANEOUS at 09:36

## 2023-06-20 RX ADMIN — FUROSEMIDE 40 MG: 40 TABLET ORAL at 09:38

## 2023-06-20 ASSESSMENT — PAIN SCALES - WONG BAKER
WONGBAKER_NUMERICALRESPONSE: 0
WONGBAKER_NUMERICALRESPONSE: 8

## 2023-06-20 ASSESSMENT — PAIN DESCRIPTION - DESCRIPTORS
DESCRIPTORS: DISCOMFORT
DESCRIPTORS: DISCOMFORT

## 2023-06-20 ASSESSMENT — PAIN DESCRIPTION - FREQUENCY: FREQUENCY: INTERMITTENT

## 2023-06-20 ASSESSMENT — PAIN DESCRIPTION - PAIN TYPE: TYPE: ACUTE PAIN

## 2023-06-20 ASSESSMENT — PAIN SCALES - GENERAL
PAINLEVEL_OUTOF10: 8
PAINLEVEL_OUTOF10: 8

## 2023-06-20 ASSESSMENT — PAIN DESCRIPTION - ORIENTATION
ORIENTATION: OTHER (COMMENT)
ORIENTATION: RIGHT;LEFT;UPPER;LOWER

## 2023-06-20 ASSESSMENT — PAIN - FUNCTIONAL ASSESSMENT
PAIN_FUNCTIONAL_ASSESSMENT: ACTIVITIES ARE NOT PREVENTED
PAIN_FUNCTIONAL_ASSESSMENT: ACTIVITIES ARE NOT PREVENTED

## 2023-06-20 ASSESSMENT — PAIN DESCRIPTION - ONSET: ONSET: GRADUAL

## 2023-06-20 ASSESSMENT — PAIN DESCRIPTION - LOCATION
LOCATION: GENERALIZED
LOCATION: OTHER (COMMENT)

## 2023-06-20 NOTE — PLAN OF CARE
Problem: Discharge Planning  Goal: Discharge to home or other facility with appropriate resources  6/19/2023 2013 by Ranny Siemens, LPN  Outcome: Progressing  6/19/2023 0932 by Rae Black LPN  Outcome: Progressing     Problem: Safety - Adult  Goal: Free from fall injury  Outcome: Progressing     Problem: Pain  Goal: Verbalizes/displays adequate comfort level or baseline comfort level  Outcome: Progressing     Problem: Skin/Tissue Integrity  Goal: Absence of new skin breakdown  Description: 1. Monitor for areas of redness and/or skin breakdown  2. Assess vascular access sites hourly  3. Every 4-6 hours minimum:  Change oxygen saturation probe site  4. Every 4-6 hours:  If on nasal continuous positive airway pressure, respiratory therapy assess nares and determine need for appliance change or resting period.   Outcome: Progressing     Problem: ABCDS Injury Assessment  Goal: Absence of physical injury  Outcome: Progressing

## 2023-06-21 ENCOUNTER — APPOINTMENT (OUTPATIENT)
Dept: GENERAL RADIOLOGY | Age: 88
DRG: 056 | End: 2023-06-21
Attending: PHYSICAL MEDICINE & REHABILITATION
Payer: MEDICARE

## 2023-06-21 LAB
ALBUMIN SERPL-MCNC: 2.4 GM/DL (ref 3.4–5)
ALP BLD-CCNC: 303 IU/L (ref 40–128)
ALT SERPL-CCNC: 44 U/L (ref 10–40)
ANION GAP SERPL CALCULATED.3IONS-SCNC: 10 MMOL/L (ref 4–16)
AST SERPL-CCNC: 29 IU/L (ref 15–37)
BASOPHILS ABSOLUTE: 0 K/CU MM
BASOPHILS RELATIVE PERCENT: 0.1 % (ref 0–1)
BILIRUB SERPL-MCNC: 0.4 MG/DL (ref 0–1)
BUN SERPL-MCNC: 26 MG/DL (ref 6–23)
CALCIUM SERPL-MCNC: 7.8 MG/DL (ref 8.3–10.6)
CHLORIDE BLD-SCNC: 98 MMOL/L (ref 99–110)
CO2: 28 MMOL/L (ref 21–32)
CREAT SERPL-MCNC: 0.7 MG/DL (ref 0.6–1.1)
DIFFERENTIAL TYPE: ABNORMAL
EOSINOPHILS ABSOLUTE: 0.2 K/CU MM
EOSINOPHILS RELATIVE PERCENT: 1 % (ref 0–3)
GFR SERPL CREATININE-BSD FRML MDRD: >60 ML/MIN/1.73M2
GLUCOSE BLD-MCNC: 250 MG/DL (ref 70–99)
GLUCOSE SERPL-MCNC: 262 MG/DL (ref 70–99)
HCT VFR BLD CALC: 29.2 % (ref 37–47)
HCT VFR BLD CALC: 31.6 % (ref 37–47)
HEMOGLOBIN: 8.1 GM/DL (ref 12.5–16)
HEMOGLOBIN: 8.2 GM/DL (ref 12.5–16)
IMMATURE NEUTROPHIL %: 1 % (ref 0–0.43)
LYMPHOCYTES ABSOLUTE: 0.6 K/CU MM
LYMPHOCYTES RELATIVE PERCENT: 3.7 % (ref 24–44)
MAGNESIUM: 2 MG/DL (ref 1.8–2.4)
MCH RBC QN AUTO: 23.3 PG (ref 27–31)
MCHC RBC AUTO-ENTMCNC: 25.9 % (ref 32–36)
MCV RBC AUTO: 89.8 FL (ref 78–100)
MONOCYTES ABSOLUTE: 0.9 K/CU MM
MONOCYTES RELATIVE PERCENT: 5.8 % (ref 0–4)
NUCLEATED RBC %: 0 %
PDW BLD-RTO: 16.6 % (ref 11.7–14.9)
PHOSPHORUS: 3.1 MG/DL (ref 2.5–4.9)
PLATELET # BLD: 289 K/CU MM (ref 140–440)
PMV BLD AUTO: 10.9 FL (ref 7.5–11.1)
POTASSIUM SERPL-SCNC: 4.8 MMOL/L (ref 3.5–5.1)
PRO-BNP: 2277 PG/ML
PROCALCITONIN SERPL-MCNC: 0.33 NG/ML
RBC # BLD: 3.52 M/CU MM (ref 4.2–5.4)
SEGMENTED NEUTROPHILS ABSOLUTE COUNT: 13.8 K/CU MM
SEGMENTED NEUTROPHILS RELATIVE PERCENT: 88.4 % (ref 36–66)
SODIUM BLD-SCNC: 136 MMOL/L (ref 135–145)
TOTAL IMMATURE NEUTOROPHIL: 0.15 K/CU MM
TOTAL NUCLEATED RBC: 0 K/CU MM
TOTAL PROTEIN: 4.3 GM/DL (ref 6.4–8.2)
WBC # BLD: 15.6 K/CU MM (ref 4–10.5)

## 2023-06-21 PROCEDURE — 83735 ASSAY OF MAGNESIUM: CPT

## 2023-06-21 PROCEDURE — 85025 COMPLETE CBC W/AUTO DIFF WBC: CPT

## 2023-06-21 PROCEDURE — 80053 COMPREHEN METABOLIC PANEL: CPT

## 2023-06-21 PROCEDURE — 2580000003 HC RX 258: Performed by: PHYSICAL MEDICINE & REHABILITATION

## 2023-06-21 PROCEDURE — 97530 THERAPEUTIC ACTIVITIES: CPT

## 2023-06-21 PROCEDURE — 82962 GLUCOSE BLOOD TEST: CPT

## 2023-06-21 PROCEDURE — C9113 INJ PANTOPRAZOLE SODIUM, VIA: HCPCS | Performed by: INTERNAL MEDICINE

## 2023-06-21 PROCEDURE — 71045 X-RAY EXAM CHEST 1 VIEW: CPT

## 2023-06-21 PROCEDURE — 92526 ORAL FUNCTION THERAPY: CPT

## 2023-06-21 PROCEDURE — 83880 ASSAY OF NATRIURETIC PEPTIDE: CPT

## 2023-06-21 PROCEDURE — 2500000003 HC RX 250 WO HCPCS: Performed by: INTERNAL MEDICINE

## 2023-06-21 PROCEDURE — 94640 AIRWAY INHALATION TREATMENT: CPT

## 2023-06-21 PROCEDURE — 74018 RADEX ABDOMEN 1 VIEW: CPT

## 2023-06-21 PROCEDURE — 6360000002 HC RX W HCPCS: Performed by: PHYSICAL MEDICINE & REHABILITATION

## 2023-06-21 PROCEDURE — 84145 PROCALCITONIN (PCT): CPT

## 2023-06-21 PROCEDURE — 97110 THERAPEUTIC EXERCISES: CPT

## 2023-06-21 PROCEDURE — 85014 HEMATOCRIT: CPT

## 2023-06-21 PROCEDURE — 6370000000 HC RX 637 (ALT 250 FOR IP): Performed by: PHYSICAL MEDICINE & REHABILITATION

## 2023-06-21 PROCEDURE — 97535 SELF CARE MNGMENT TRAINING: CPT

## 2023-06-21 PROCEDURE — 6370000000 HC RX 637 (ALT 250 FOR IP): Performed by: INTERNAL MEDICINE

## 2023-06-21 PROCEDURE — 6360000002 HC RX W HCPCS: Performed by: INTERNAL MEDICINE

## 2023-06-21 PROCEDURE — 85018 HEMOGLOBIN: CPT

## 2023-06-21 PROCEDURE — 1280000000 HC REHAB R&B

## 2023-06-21 PROCEDURE — 84100 ASSAY OF PHOSPHORUS: CPT

## 2023-06-21 PROCEDURE — 36415 COLL VENOUS BLD VENIPUNCTURE: CPT

## 2023-06-21 PROCEDURE — 92507 TX SP LANG VOICE COMM INDIV: CPT

## 2023-06-21 PROCEDURE — 94761 N-INVAS EAR/PLS OXIMETRY MLT: CPT

## 2023-06-21 PROCEDURE — 99233 SBSQ HOSP IP/OBS HIGH 50: CPT | Performed by: PHYSICAL MEDICINE & REHABILITATION

## 2023-06-21 RX ORDER — IPRATROPIUM BROMIDE AND ALBUTEROL SULFATE 2.5; .5 MG/3ML; MG/3ML
1 SOLUTION RESPIRATORY (INHALATION) EVERY 4 HOURS PRN
Status: DISCONTINUED | OUTPATIENT
Start: 2023-06-21 | End: 2023-06-29 | Stop reason: HOSPADM

## 2023-06-21 RX ORDER — IPRATROPIUM BROMIDE AND ALBUTEROL SULFATE 2.5; .5 MG/3ML; MG/3ML
1 SOLUTION RESPIRATORY (INHALATION)
Status: DISCONTINUED | OUTPATIENT
Start: 2023-06-21 | End: 2023-06-26

## 2023-06-21 RX ORDER — IPRATROPIUM BROMIDE AND ALBUTEROL SULFATE 2.5; .5 MG/3ML; MG/3ML
1 SOLUTION RESPIRATORY (INHALATION) 2 TIMES DAILY
Status: DISCONTINUED | OUTPATIENT
Start: 2023-06-21 | End: 2023-06-21

## 2023-06-21 RX ORDER — GUAIFENESIN 200 MG/10ML
200 LIQUID ORAL 4 TIMES DAILY
Status: DISCONTINUED | OUTPATIENT
Start: 2023-06-21 | End: 2023-06-29 | Stop reason: HOSPADM

## 2023-06-21 RX ADMIN — FUROSEMIDE 40 MG: 40 TABLET ORAL at 08:45

## 2023-06-21 RX ADMIN — HYDROCORTISONE ACETATE 25 MG: 25 SUPPOSITORY RECTAL at 22:41

## 2023-06-21 RX ADMIN — HYDROCORTISONE ACETATE 25 MG: 25 SUPPOSITORY RECTAL at 08:45

## 2023-06-21 RX ADMIN — ONDANSETRON 4 MG: 2 INJECTION INTRAMUSCULAR; INTRAVENOUS at 05:34

## 2023-06-21 RX ADMIN — CEFEPIME 2000 MG: 2 INJECTION, POWDER, FOR SOLUTION INTRAVENOUS at 07:00

## 2023-06-21 RX ADMIN — GUAIFENESIN 200 MG: 100 SOLUTION ORAL at 23:18

## 2023-06-21 RX ADMIN — AMLODIPINE BESYLATE 5 MG: 5 TABLET ORAL at 08:45

## 2023-06-21 RX ADMIN — GUAIFENESIN 200 MG: 100 SOLUTION ORAL at 13:24

## 2023-06-21 RX ADMIN — IPRATROPIUM BROMIDE AND ALBUTEROL SULFATE 1 DOSE: 2.5; .5 SOLUTION RESPIRATORY (INHALATION) at 19:53

## 2023-06-21 RX ADMIN — ATORVASTATIN CALCIUM 40 MG: 40 TABLET, FILM COATED ORAL at 23:17

## 2023-06-21 RX ADMIN — PANTOPRAZOLE SODIUM 40 MG: 40 INJECTION, POWDER, FOR SOLUTION INTRAVENOUS at 10:33

## 2023-06-21 RX ADMIN — HYDROMORPHONE HYDROCHLORIDE 0.25 MG: 1 INJECTION, SOLUTION INTRAMUSCULAR; INTRAVENOUS; SUBCUTANEOUS at 05:34

## 2023-06-21 RX ADMIN — IPRATROPIUM BROMIDE AND ALBUTEROL SULFATE 1 DOSE: 2.5; .5 SOLUTION RESPIRATORY (INHALATION) at 07:25

## 2023-06-21 RX ADMIN — LORAZEPAM 0.25 MG: 0.5 TABLET ORAL at 08:45

## 2023-06-21 RX ADMIN — PANTOPRAZOLE SODIUM 40 MG: 40 INJECTION, POWDER, FOR SOLUTION INTRAVENOUS at 22:41

## 2023-06-21 RX ADMIN — LORAZEPAM 0.25 MG: 0.5 TABLET ORAL at 23:17

## 2023-06-21 RX ADMIN — CEFEPIME 2000 MG: 2 INJECTION, POWDER, FOR SOLUTION INTRAVENOUS at 21:03

## 2023-06-21 ASSESSMENT — PAIN DESCRIPTION - LOCATION: LOCATION: GENERALIZED

## 2023-06-21 ASSESSMENT — PAIN SCALES - GENERAL
PAINLEVEL_OUTOF10: 0
PAINLEVEL_OUTOF10: 0

## 2023-06-21 ASSESSMENT — PAIN SCALES - WONG BAKER: WONGBAKER_NUMERICALRESPONSE: 6

## 2023-06-21 NOTE — FLOWSHEET NOTE
[x] daily progress note       [] discharge       Patient Name:  Leon Torre   :  1934 MRN: [id number]  Room:  [address] [place] [address]-A Date of Admission: 2023  Rehabilitation Diagnosis:   Cerebral infarction, unspecified [I63.9]  Acute CVA (cerebrovascular accident) [facility]) [I63.9]       Date 2023       Day of ARU Week:  6   Time IN/OUT 6540-5754    Co-Treat Minutes 28   TOTAL Tx Time Mins 28   Variance Time -32 minutes   Variance Time [x]   Adamant refusal (pt waving hands no and resisting therapists)   []   Medical hold/reason:    []   Illness   []   Off Unit for test/procedure  []   Extra time needed to complete task  []   Therapeutic need  []   Other (specify):   Restrictions Restrictions/Precautions  Restrictions/Precautions: Fall Risk, General Precautions, NPO (NG tube, 24/7 sitter)      Communication with other providers: [x]   OK to see per nursing:     []   Spoke with team member regarding:      Subjective observations and cognitive status: Pt seen sitting up in recliner at beginning of treatment. Co-treat with SLP Liz Kruger). Pt required max encouragement to participate in therapy. Pt very resistant and waving hands and shutting eyes. Treatment deferred after patient adamantly shutting eyes and continuously resistant to therapy despite max education and encouragement.     Pain level/location:  No reports of pain    Discharge recommendations   Anticipated discharge date:  2023  Destination: []home alone   []home alone with assist PRN     [x] home w/ family      [] Continuous supervision  []SNF    [] Assisted living     [] Other:  Continued therapy: [x]HHC PT  []OUTPATIENT PT   [] No Further PT  []SNF PT  Caregiver training recommended: [x]Yes  [] No   Equipment needs: considering hospital bed, 2WW, and transport chair      A cotreat was completed this date with  [] PT    [] OT   [x] ST      This pt requires simultaneous intervention of 2 skilled therapists to safely complete tasks to address

## 2023-06-21 NOTE — FLOWSHEET NOTE
As I was changing pt, I noticed blood in her depend. I notified the nurse, she came to look at it and we saw it was coming from her bottom.

## 2023-06-21 NOTE — RT PROTOCOL NOTE
RT Inhaler-Nebulizer Bronchodilator Protocol Note    There is a bronchodilator order in the chart from a provider indicating to follow the RT Bronchodilator Protocol and there is an Initiate RT Inhaler-Nebulizer Bronchodilator Protocol order as well (see protocol at bottom of note). CXR Findings:  No results found. The findings from the last RT Protocol Assessment were as follows:   History Pulmonary Disease: None or smoker <15 pack years  Respiratory Pattern: Regular pattern and RR 12-20 bpm  Breath Sounds: Inspiratory and expiratory or bilateral wheezing and/or rhonchi  Cough: Strong, spontaneous, non-productive  Indication for Bronchodilator Therapy: Wheezing associated with pulm disorder  Bronchodilator Assessment Score: 6    Aerosolized bronchodilator medication orders have been revised according to the RT Inhaler-Nebulizer Bronchodilator Protocol below. Respiratory Therapist to perform RT Therapy Protocol Assessment initially then follow the protocol. Repeat RT Therapy Protocol Assessment PRN for score 0-3 or on second treatment, BID, and PRN for scores above 3. No Indications - adjust the frequency to every 6 hours PRN wheezing or bronchospasm, if no treatments needed after 48 hours then discontinue using Per Protocol order mode. If indication present, adjust the RT bronchodilator orders based on the Bronchodilator Assessment Score as indicated below. Use Inhaler orders unless patient has one or more of the following: on home nebulizer, not able to hold breath for 10 seconds, is not alert and oriented, cannot activate and use MDI correctly, or respiratory rate 25 breaths per minute or more, then use the equivalent nebulizer order(s) with same Frequency and PRN reasons based on the score. If a patient is on this medication at home then do not decrease Frequency below that used at home.     0-3 - enter or revise RT bronchodilator order(s) to equivalent RT Bronchodilator order with Frequency

## 2023-06-22 ENCOUNTER — CARE COORDINATION (OUTPATIENT)
Dept: MEDSURG UNIT | Age: 88
End: 2023-06-22

## 2023-06-22 LAB
ALBUMIN SERPL-MCNC: 2.7 GM/DL (ref 3.4–5)
ALP BLD-CCNC: 279 IU/L (ref 40–128)
ALT SERPL-CCNC: 34 U/L (ref 10–40)
ANION GAP SERPL CALCULATED.3IONS-SCNC: 11 MMOL/L (ref 4–16)
APTT: 34.5 SECONDS (ref 25.1–37.1)
AST SERPL-CCNC: 22 IU/L (ref 15–37)
BASOPHILS ABSOLUTE: 0 K/CU MM
BASOPHILS RELATIVE PERCENT: 0.1 % (ref 0–1)
BILIRUB SERPL-MCNC: 0.6 MG/DL (ref 0–1)
BUN SERPL-MCNC: 24 MG/DL (ref 6–23)
CALCIUM SERPL-MCNC: 7.9 MG/DL (ref 8.3–10.6)
CHLORIDE BLD-SCNC: 97 MMOL/L (ref 99–110)
CO2: 28 MMOL/L (ref 21–32)
CREAT SERPL-MCNC: 0.7 MG/DL (ref 0.6–1.1)
DIFFERENTIAL TYPE: ABNORMAL
EOSINOPHILS ABSOLUTE: 0.1 K/CU MM
EOSINOPHILS RELATIVE PERCENT: 0.3 % (ref 0–3)
GFR SERPL CREATININE-BSD FRML MDRD: >60 ML/MIN/1.73M2
GLUCOSE BLD-MCNC: 197 MG/DL (ref 70–99)
GLUCOSE BLD-MCNC: 204 MG/DL (ref 70–99)
GLUCOSE BLD-MCNC: 299 MG/DL (ref 70–99)
GLUCOSE BLD-MCNC: 304 MG/DL (ref 70–99)
GLUCOSE BLD-MCNC: 322 MG/DL (ref 70–99)
GLUCOSE SERPL-MCNC: 229 MG/DL (ref 70–99)
HCT VFR BLD CALC: 28.8 % (ref 37–47)
HCT VFR BLD CALC: 30.6 % (ref 37–47)
HCT VFR BLD CALC: 31.3 % (ref 37–47)
HEMOGLOBIN: 8.1 GM/DL (ref 12.5–16)
HEMOGLOBIN: 8.3 GM/DL (ref 12.5–16)
HEMOGLOBIN: 8.4 GM/DL (ref 12.5–16)
IMMATURE NEUTROPHIL %: 0.8 % (ref 0–0.43)
INR BLD: 1.3 INDEX
LIPASE: 42 IU/L (ref 13–60)
LYMPHOCYTES ABSOLUTE: 0.6 K/CU MM
LYMPHOCYTES RELATIVE PERCENT: 3.5 % (ref 24–44)
MCH RBC QN AUTO: 22.8 PG (ref 27–31)
MCHC RBC AUTO-ENTMCNC: 26.5 % (ref 32–36)
MCV RBC AUTO: 86 FL (ref 78–100)
MONOCYTES ABSOLUTE: 1 K/CU MM
MONOCYTES RELATIVE PERCENT: 6.1 % (ref 0–4)
NUCLEATED RBC %: 0 %
PDW BLD-RTO: 16.2 % (ref 11.7–14.9)
PLATELET # BLD: 404 K/CU MM (ref 140–440)
PMV BLD AUTO: 11 FL (ref 7.5–11.1)
POTASSIUM SERPL-SCNC: 4.9 MMOL/L (ref 3.5–5.1)
PROTHROMBIN TIME: 16 SECONDS (ref 11.7–14.5)
RBC # BLD: 3.64 M/CU MM (ref 4.2–5.4)
SEGMENTED NEUTROPHILS ABSOLUTE COUNT: 14.1 K/CU MM
SEGMENTED NEUTROPHILS RELATIVE PERCENT: 89.2 % (ref 36–66)
SODIUM BLD-SCNC: 136 MMOL/L (ref 135–145)
TOTAL IMMATURE NEUTOROPHIL: 0.13 K/CU MM
TOTAL NUCLEATED RBC: 0 K/CU MM
TOTAL PROTEIN: 4.7 GM/DL (ref 6.4–8.2)
WBC # BLD: 15.8 K/CU MM (ref 4–10.5)

## 2023-06-22 PROCEDURE — 85610 PROTHROMBIN TIME: CPT

## 2023-06-22 PROCEDURE — 6360000002 HC RX W HCPCS: Performed by: INTERNAL MEDICINE

## 2023-06-22 PROCEDURE — 94761 N-INVAS EAR/PLS OXIMETRY MLT: CPT

## 2023-06-22 PROCEDURE — 83690 ASSAY OF LIPASE: CPT

## 2023-06-22 PROCEDURE — 36415 COLL VENOUS BLD VENIPUNCTURE: CPT

## 2023-06-22 PROCEDURE — 99233 SBSQ HOSP IP/OBS HIGH 50: CPT | Performed by: PHYSICAL MEDICINE & REHABILITATION

## 2023-06-22 PROCEDURE — 97535 SELF CARE MNGMENT TRAINING: CPT

## 2023-06-22 PROCEDURE — 94640 AIRWAY INHALATION TREATMENT: CPT

## 2023-06-22 PROCEDURE — 82962 GLUCOSE BLOOD TEST: CPT

## 2023-06-22 PROCEDURE — 6370000000 HC RX 637 (ALT 250 FOR IP): Performed by: PHYSICAL MEDICINE & REHABILITATION

## 2023-06-22 PROCEDURE — 6370000000 HC RX 637 (ALT 250 FOR IP): Performed by: INTERNAL MEDICINE

## 2023-06-22 PROCEDURE — 2580000003 HC RX 258: Performed by: PHYSICAL MEDICINE & REHABILITATION

## 2023-06-22 PROCEDURE — 1280000000 HC REHAB R&B

## 2023-06-22 PROCEDURE — 92507 TX SP LANG VOICE COMM INDIV: CPT

## 2023-06-22 PROCEDURE — 97530 THERAPEUTIC ACTIVITIES: CPT

## 2023-06-22 PROCEDURE — 80053 COMPREHEN METABOLIC PANEL: CPT

## 2023-06-22 PROCEDURE — 85025 COMPLETE CBC W/AUTO DIFF WBC: CPT

## 2023-06-22 PROCEDURE — C9113 INJ PANTOPRAZOLE SODIUM, VIA: HCPCS | Performed by: INTERNAL MEDICINE

## 2023-06-22 PROCEDURE — 85018 HEMOGLOBIN: CPT

## 2023-06-22 PROCEDURE — 2500000003 HC RX 250 WO HCPCS: Performed by: INTERNAL MEDICINE

## 2023-06-22 PROCEDURE — 6360000002 HC RX W HCPCS: Performed by: PHYSICAL MEDICINE & REHABILITATION

## 2023-06-22 PROCEDURE — 85730 THROMBOPLASTIN TIME PARTIAL: CPT

## 2023-06-22 PROCEDURE — 85014 HEMATOCRIT: CPT

## 2023-06-22 PROCEDURE — 92526 ORAL FUNCTION THERAPY: CPT

## 2023-06-22 PROCEDURE — 97116 GAIT TRAINING THERAPY: CPT

## 2023-06-22 RX ORDER — GLUCAGON 1 MG/ML
1 KIT INJECTION PRN
Status: DISCONTINUED | OUTPATIENT
Start: 2023-06-22 | End: 2023-06-29 | Stop reason: HOSPADM

## 2023-06-22 RX ORDER — DEXTROSE MONOHYDRATE 100 MG/ML
INJECTION, SOLUTION INTRAVENOUS CONTINUOUS PRN
Status: DISCONTINUED | OUTPATIENT
Start: 2023-06-22 | End: 2023-06-29 | Stop reason: HOSPADM

## 2023-06-22 RX ORDER — INSULIN LISPRO 100 [IU]/ML
0-4 INJECTION, SOLUTION INTRAVENOUS; SUBCUTANEOUS EVERY 6 HOURS
Status: DISCONTINUED | OUTPATIENT
Start: 2023-06-22 | End: 2023-06-29 | Stop reason: HOSPADM

## 2023-06-22 RX ORDER — INSULIN LISPRO 100 [IU]/ML
0-4 INJECTION, SOLUTION INTRAVENOUS; SUBCUTANEOUS NIGHTLY
Status: DISCONTINUED | OUTPATIENT
Start: 2023-06-22 | End: 2023-06-29 | Stop reason: HOSPADM

## 2023-06-22 RX ADMIN — HYDROCORTISONE ACETATE 25 MG: 25 SUPPOSITORY RECTAL at 11:02

## 2023-06-22 RX ADMIN — PANTOPRAZOLE SODIUM 40 MG: 40 INJECTION, POWDER, FOR SOLUTION INTRAVENOUS at 11:07

## 2023-06-22 RX ADMIN — CEFEPIME 2000 MG: 2 INJECTION, POWDER, FOR SOLUTION INTRAVENOUS at 11:15

## 2023-06-22 RX ADMIN — LORAZEPAM 0.25 MG: 0.5 TABLET ORAL at 11:01

## 2023-06-22 RX ADMIN — GUAIFENESIN 200 MG: 100 SOLUTION ORAL at 18:53

## 2023-06-22 RX ADMIN — PANTOPRAZOLE SODIUM 40 MG: 40 INJECTION, POWDER, FOR SOLUTION INTRAVENOUS at 23:53

## 2023-06-22 RX ADMIN — LORAZEPAM 0.25 MG: 0.5 TABLET ORAL at 23:52

## 2023-06-22 RX ADMIN — ACETAMINOPHEN 650 MG: 325 TABLET ORAL at 06:53

## 2023-06-22 RX ADMIN — INSULIN LISPRO 3 UNITS: 100 INJECTION, SOLUTION INTRAVENOUS; SUBCUTANEOUS at 18:52

## 2023-06-22 RX ADMIN — HYDROCORTISONE ACETATE 25 MG: 25 SUPPOSITORY RECTAL at 23:52

## 2023-06-22 RX ADMIN — IPRATROPIUM BROMIDE AND ALBUTEROL SULFATE 1 DOSE: 2.5; .5 SOLUTION RESPIRATORY (INHALATION) at 19:45

## 2023-06-22 RX ADMIN — HYDROMORPHONE HYDROCHLORIDE 0.25 MG: 1 INJECTION, SOLUTION INTRAMUSCULAR; INTRAVENOUS; SUBCUTANEOUS at 18:36

## 2023-06-22 RX ADMIN — GUAIFENESIN 200 MG: 100 SOLUTION ORAL at 23:52

## 2023-06-22 RX ADMIN — GUAIFENESIN 200 MG: 100 SOLUTION ORAL at 15:04

## 2023-06-22 RX ADMIN — ATORVASTATIN CALCIUM 40 MG: 40 TABLET, FILM COATED ORAL at 23:52

## 2023-06-22 RX ADMIN — FUROSEMIDE 40 MG: 40 TABLET ORAL at 11:01

## 2023-06-22 RX ADMIN — IPRATROPIUM BROMIDE AND ALBUTEROL SULFATE 1 DOSE: 2.5; .5 SOLUTION RESPIRATORY (INHALATION) at 15:54

## 2023-06-22 RX ADMIN — GUAIFENESIN 200 MG: 100 SOLUTION ORAL at 11:01

## 2023-06-22 RX ADMIN — AMLODIPINE BESYLATE 5 MG: 5 TABLET ORAL at 11:01

## 2023-06-22 RX ADMIN — ACETAMINOPHEN 650 MG: 325 TABLET ORAL at 15:03

## 2023-06-22 RX ADMIN — ONDANSETRON 4 MG: 2 INJECTION INTRAMUSCULAR; INTRAVENOUS at 17:46

## 2023-06-22 RX ADMIN — IPRATROPIUM BROMIDE AND ALBUTEROL SULFATE 1 DOSE: 2.5; .5 SOLUTION RESPIRATORY (INHALATION) at 11:57

## 2023-06-22 RX ADMIN — IPRATROPIUM BROMIDE AND ALBUTEROL SULFATE 1 DOSE: 2.5; .5 SOLUTION RESPIRATORY (INHALATION) at 06:53

## 2023-06-22 ASSESSMENT — PAIN SCALES - GENERAL
PAINLEVEL_OUTOF10: 0
PAINLEVEL_OUTOF10: 2
PAINLEVEL_OUTOF10: 0

## 2023-06-22 ASSESSMENT — PAIN - FUNCTIONAL ASSESSMENT
PAIN_FUNCTIONAL_ASSESSMENT: PREVENTS OR INTERFERES SOME ACTIVE ACTIVITIES AND ADLS
PAIN_FUNCTIONAL_ASSESSMENT: ACTIVITIES ARE NOT PREVENTED
PAIN_FUNCTIONAL_ASSESSMENT: PREVENTS OR INTERFERES SOME ACTIVE ACTIVITIES AND ADLS

## 2023-06-22 ASSESSMENT — PAIN DESCRIPTION - ONSET: ONSET: GRADUAL

## 2023-06-22 ASSESSMENT — PAIN DESCRIPTION - PAIN TYPE: TYPE: ACUTE PAIN

## 2023-06-22 ASSESSMENT — PAIN SCALES - WONG BAKER
WONGBAKER_NUMERICALRESPONSE: 0
WONGBAKER_NUMERICALRESPONSE: 2

## 2023-06-22 ASSESSMENT — PAIN DESCRIPTION - ORIENTATION
ORIENTATION: RIGHT
ORIENTATION: RIGHT;LEFT

## 2023-06-22 ASSESSMENT — PAIN DESCRIPTION - FREQUENCY: FREQUENCY: INTERMITTENT

## 2023-06-22 ASSESSMENT — PAIN DESCRIPTION - LOCATION
LOCATION: KNEE
LOCATION: KNEE

## 2023-06-22 ASSESSMENT — PAIN DESCRIPTION - DESCRIPTORS: DESCRIPTORS: DISCOMFORT

## 2023-06-22 NOTE — CARE COORDINATION
Provided stroke education folder.  and pt's daughter at bedside. Pt alert but aphasic. Discussed post stroke medications, [facility] support group for stroke survivors and caregivers and other community resources with pt's daughter. Daughter made aware that I can be contacted for any additional questions regarding stroke and left my contact information. Denies additional needs at this time.

## 2023-06-22 NOTE — PLAN OF CARE
Problem: Discharge Planning  Goal: Discharge to home or other facility with appropriate resources  Outcome: Progressing     Problem: Safety - Adult  Goal: Free from fall injury  Outcome: Progressing     Problem: Pain  Goal: Verbalizes/displays adequate comfort level or baseline comfort level  Outcome: Progressing     Problem: Skin/Tissue Integrity  Goal: Absence of new skin breakdown  Description: 1. Monitor for areas of redness and/or skin breakdown  2. Assess vascular access sites hourly  3. Every 4-6 hours minimum:  Change oxygen saturation probe site  4. Every 4-6 hours:  If on nasal continuous positive airway pressure, respiratory therapy assess nares and determine need for appliance change or resting period.   Outcome: Progressing     Problem: ABCDS Injury Assessment  Goal: Absence of physical injury  Outcome: Progressing     Problem: Nutrition Deficit:  Goal: Optimize nutritional status  Outcome: Progressing  Flowsheets (Taken 6/21/2023 1434 by Yovani Silva RD, LD)  Nutrient intake appropriate for improving, restoring, or maintaining nutritional needs: Assess nutritional status and recommend course of action

## 2023-06-23 ENCOUNTER — APPOINTMENT (OUTPATIENT)
Dept: GENERAL RADIOLOGY | Age: 88
DRG: 056 | End: 2023-06-23
Attending: PHYSICAL MEDICINE & REHABILITATION
Payer: MEDICARE

## 2023-06-23 LAB
ESTIMATED AVERAGE GLUCOSE: 97 MG/DL
GLUCOSE BLD-MCNC: 203 MG/DL (ref 70–99)
GLUCOSE BLD-MCNC: 227 MG/DL (ref 70–99)
GLUCOSE BLD-MCNC: 255 MG/DL (ref 70–99)
GLUCOSE BLD-MCNC: 257 MG/DL (ref 70–99)
HBA1C MFR BLD: 5 % (ref 4.2–6.3)
HCT VFR BLD CALC: 28.9 % (ref 37–47)
HCT VFR BLD CALC: 33.2 % (ref 37–47)
HEMOGLOBIN: 8 GM/DL (ref 12.5–16)
HEMOGLOBIN: 9.3 GM/DL (ref 12.5–16)

## 2023-06-23 PROCEDURE — 92526 ORAL FUNCTION THERAPY: CPT

## 2023-06-23 PROCEDURE — 1280000000 HC REHAB R&B

## 2023-06-23 PROCEDURE — C9113 INJ PANTOPRAZOLE SODIUM, VIA: HCPCS | Performed by: INTERNAL MEDICINE

## 2023-06-23 PROCEDURE — 99233 SBSQ HOSP IP/OBS HIGH 50: CPT | Performed by: PHYSICAL MEDICINE & REHABILITATION

## 2023-06-23 PROCEDURE — 2580000003 HC RX 258: Performed by: PHYSICAL MEDICINE & REHABILITATION

## 2023-06-23 PROCEDURE — 6370000000 HC RX 637 (ALT 250 FOR IP): Performed by: INTERNAL MEDICINE

## 2023-06-23 PROCEDURE — 99211 OFF/OP EST MAY X REQ PHY/QHP: CPT

## 2023-06-23 PROCEDURE — 36415 COLL VENOUS BLD VENIPUNCTURE: CPT

## 2023-06-23 PROCEDURE — 97530 THERAPEUTIC ACTIVITIES: CPT

## 2023-06-23 PROCEDURE — 82962 GLUCOSE BLOOD TEST: CPT

## 2023-06-23 PROCEDURE — 2580000003 HC RX 258

## 2023-06-23 PROCEDURE — 85018 HEMOGLOBIN: CPT

## 2023-06-23 PROCEDURE — 74018 RADEX ABDOMEN 1 VIEW: CPT

## 2023-06-23 PROCEDURE — 92507 TX SP LANG VOICE COMM INDIV: CPT

## 2023-06-23 PROCEDURE — 94761 N-INVAS EAR/PLS OXIMETRY MLT: CPT

## 2023-06-23 PROCEDURE — 97535 SELF CARE MNGMENT TRAINING: CPT

## 2023-06-23 PROCEDURE — 97116 GAIT TRAINING THERAPY: CPT

## 2023-06-23 PROCEDURE — 83036 HEMOGLOBIN GLYCOSYLATED A1C: CPT

## 2023-06-23 PROCEDURE — 94640 AIRWAY INHALATION TREATMENT: CPT

## 2023-06-23 PROCEDURE — 6360000002 HC RX W HCPCS: Performed by: PHYSICAL MEDICINE & REHABILITATION

## 2023-06-23 PROCEDURE — 85014 HEMATOCRIT: CPT

## 2023-06-23 PROCEDURE — 6360000002 HC RX W HCPCS: Performed by: INTERNAL MEDICINE

## 2023-06-23 PROCEDURE — 2500000003 HC RX 250 WO HCPCS: Performed by: INTERNAL MEDICINE

## 2023-06-23 PROCEDURE — 6370000000 HC RX 637 (ALT 250 FOR IP): Performed by: PHYSICAL MEDICINE & REHABILITATION

## 2023-06-23 RX ORDER — SODIUM CHLORIDE 9 MG/ML
INJECTION INTRAVENOUS
Status: COMPLETED
Start: 2023-06-23 | End: 2023-06-23

## 2023-06-23 RX ADMIN — LORAZEPAM 0.25 MG: 0.5 TABLET ORAL at 09:17

## 2023-06-23 RX ADMIN — PANTOPRAZOLE SODIUM 40 MG: 40 INJECTION, POWDER, FOR SOLUTION INTRAVENOUS at 11:16

## 2023-06-23 RX ADMIN — GUAIFENESIN 200 MG: 100 SOLUTION ORAL at 17:22

## 2023-06-23 RX ADMIN — CEFEPIME 2000 MG: 2 INJECTION, POWDER, FOR SOLUTION INTRAVENOUS at 00:22

## 2023-06-23 RX ADMIN — INSULIN LISPRO 2 UNITS: 100 INJECTION, SOLUTION INTRAVENOUS; SUBCUTANEOUS at 06:31

## 2023-06-23 RX ADMIN — SODIUM CHLORIDE, PRESERVATIVE FREE 10 ML: 5 INJECTION INTRAVENOUS at 21:00

## 2023-06-23 RX ADMIN — IPRATROPIUM BROMIDE AND ALBUTEROL SULFATE 1 DOSE: 2.5; .5 SOLUTION RESPIRATORY (INHALATION) at 15:49

## 2023-06-23 RX ADMIN — ATORVASTATIN CALCIUM 40 MG: 40 TABLET, FILM COATED ORAL at 20:36

## 2023-06-23 RX ADMIN — FUROSEMIDE 40 MG: 40 TABLET ORAL at 09:17

## 2023-06-23 RX ADMIN — HYDROMORPHONE HYDROCHLORIDE 0.25 MG: 1 INJECTION, SOLUTION INTRAMUSCULAR; INTRAVENOUS; SUBCUTANEOUS at 02:04

## 2023-06-23 RX ADMIN — IPRATROPIUM BROMIDE AND ALBUTEROL SULFATE 1 DOSE: 2.5; .5 SOLUTION RESPIRATORY (INHALATION) at 11:24

## 2023-06-23 RX ADMIN — CEFEPIME 2000 MG: 2 INJECTION, POWDER, FOR SOLUTION INTRAVENOUS at 13:09

## 2023-06-23 RX ADMIN — IPRATROPIUM BROMIDE AND ALBUTEROL SULFATE 1 DOSE: 2.5; .5 SOLUTION RESPIRATORY (INHALATION) at 07:45

## 2023-06-23 RX ADMIN — HYDROCORTISONE ACETATE 25 MG: 25 SUPPOSITORY RECTAL at 20:36

## 2023-06-23 RX ADMIN — HYDROCORTISONE ACETATE 25 MG: 25 SUPPOSITORY RECTAL at 09:17

## 2023-06-23 RX ADMIN — HYDROMORPHONE HYDROCHLORIDE 0.25 MG: 1 INJECTION, SOLUTION INTRAMUSCULAR; INTRAVENOUS; SUBCUTANEOUS at 11:16

## 2023-06-23 RX ADMIN — LORAZEPAM 0.25 MG: 0.5 TABLET ORAL at 20:36

## 2023-06-23 RX ADMIN — INSULIN LISPRO 2 UNITS: 100 INJECTION, SOLUTION INTRAVENOUS; SUBCUTANEOUS at 17:22

## 2023-06-23 RX ADMIN — INSULIN LISPRO 1 UNITS: 100 INJECTION, SOLUTION INTRAVENOUS; SUBCUTANEOUS at 13:30

## 2023-06-23 RX ADMIN — PANTOPRAZOLE SODIUM 40 MG: 40 INJECTION, POWDER, FOR SOLUTION INTRAVENOUS at 21:00

## 2023-06-23 RX ADMIN — GUAIFENESIN 200 MG: 100 SOLUTION ORAL at 20:36

## 2023-06-23 RX ADMIN — CEFEPIME 2000 MG: 2 INJECTION, POWDER, FOR SOLUTION INTRAVENOUS at 20:35

## 2023-06-23 RX ADMIN — HYDROMORPHONE HYDROCHLORIDE 0.25 MG: 1 INJECTION, SOLUTION INTRAMUSCULAR; INTRAVENOUS; SUBCUTANEOUS at 23:40

## 2023-06-23 RX ADMIN — GUAIFENESIN 200 MG: 100 SOLUTION ORAL at 13:11

## 2023-06-23 RX ADMIN — GUAIFENESIN 200 MG: 100 SOLUTION ORAL at 09:17

## 2023-06-23 ASSESSMENT — PAIN SCALES - GENERAL
PAINLEVEL_OUTOF10: 10
PAINLEVEL_OUTOF10: 0
PAINLEVEL_OUTOF10: 5

## 2023-06-23 ASSESSMENT — PAIN DESCRIPTION - ORIENTATION
ORIENTATION: RIGHT
ORIENTATION: MID

## 2023-06-23 ASSESSMENT — PAIN DESCRIPTION - FREQUENCY: FREQUENCY: INTERMITTENT

## 2023-06-23 ASSESSMENT — PAIN SCALES - WONG BAKER: WONGBAKER_NUMERICALRESPONSE: 10

## 2023-06-23 ASSESSMENT — PAIN - FUNCTIONAL ASSESSMENT: PAIN_FUNCTIONAL_ASSESSMENT: PREVENTS OR INTERFERES WITH MANY ACTIVE NOT PASSIVE ACTIVITIES

## 2023-06-23 ASSESSMENT — PAIN DESCRIPTION - LOCATION
LOCATION: ABDOMEN;LEG
LOCATION: ABDOMEN

## 2023-06-23 ASSESSMENT — PAIN DESCRIPTION - ONSET: ONSET: ON-GOING

## 2023-06-23 ASSESSMENT — PAIN DESCRIPTION - DESCRIPTORS
DESCRIPTORS: ACHING
DESCRIPTORS: ACHING;PATIENT UNABLE TO DESCRIBE

## 2023-06-23 ASSESSMENT — PAIN DESCRIPTION - PAIN TYPE: TYPE: ACUTE PAIN

## 2023-06-23 NOTE — CONSULTS
[address], [address] [place] [address]                                  CONSULTATION    PATIENT NAME: Hailee Mcintyre                   :        1934  MED REC NO:   [id number]                          ROOM:       200  ACCOUNT NO:   [de-identified]                           ADMIT DATE: 2023  PROVIDER:     Anne Dee MD    CONSULT DATE:  2023    REASON FOR CONSULTATION/HISTORY:  The patient is an 80-year-old lady  with multiple medical problems including peptic ulcer disease,  congestive heart failure, atrial fibrillation, chronic anemia, anxiety  disorder who was admitted through the emergency room with sudden onset  of aphasia. The patient was seen by Neurology. After her hospital  admission, the patient was transferred to the rehab unit for  rehabilitation. She was also having right hemiparesis. Her CAT scan  initially was negative for any acute hemorrhage. In the rehab unit  there, she developed increasing white count and the chest x-ray showed  left basilar opacity consistent with atelectasis versus pneumonia. The  patient was started on IV antibiotics. There was a question of  aspiration. PAST MEDICAL HISTORY:  Significant for gastric bypass surgery, atrial  fibrillation, congestive heart failure. PAST SURGICAL HISTORY:  Remarkable for gastric bypass surgery. FAMILY HISTORY:  Noncontributory. SOCIAL HISTORY:  Reveals that she is a nonsmoker. No history of alcohol  or drug abuse. MEDICATIONS:  Her medications were reviewed. ALLERGIES:  She is allergic to MOTRIN and TRAMADOL. REVIEW OF SYSTEMS:  Unobtainable at this time as the patient is aphasic. PHYSICAL EXAMINATION:  GENERAL:  The patient is awake, laying in bed comfortably in no acute  respiratory distress. The patient is aphasic.   VITAL SIGNS:  Her blood pressure is 146/72 mmHg, pulse of 95 per minute,  and respiratory rate of
Comprehensive Nutrition Assessment    Type and Reason for Visit:  Initial, Consult (TF order/mgt)    Nutrition Recommendations/Plan:   Modify formula to 1.5 Demetris without fiber formula (Osmolite 1.5 Demetris) at 45 ml/hr to better meet needs with recent wt loss, malnutrition  Will provide 1620 kcal and 68 grams protein  W/O IVF, 200 ml free water flush needed Q6H to provide 1 ml/kcal fluid requirement  Monitor/replace depleted electrolytes carefully, Pt at high risk for refeeding syndrome     Malnutrition Assessment:  Malnutrition Status:  Severe malnutrition (06/17/23 1131)    Context:  Acute Illness     Findings of the 6 clinical characteristics of malnutrition:  Energy Intake:  75% or less of estimated energy requirements for 7 or more days  Weight Loss:  Greater than 2% over 1 week     Body Fat Loss: Moderate body fat loss Orbital, Buccal region   Muscle Mass Loss: Moderate muscle mass loss Temples (temporalis), Clavicles (pectoralis & deltoids)  Fluid Accumulation:  Mild Extremities   Strength:  Not Performed    Nutrition Assessment:    Pt admitted to ARU with acute CVA, R hemiparesis, dysarthria, dysphagia, aphasia, paroxysmal atrial fibrillation, aspiration pneumonia, chronic diastolic congestive heart failure, acute blood loss anemia with a GJ anastomosis ulcer. Standard without fiber formula (Osmolite 1.2 Demetris) is ordered at 40 ml/hr. During acute stay, EN did not progress past 20 ml/hr. NGT bothersome to Pt noted. Has lost 12% over the past week. Family report pt was on a pureed diet/thins over years. Poor intakes PTA. Continues to meet criteria for malnutrition. Will continue to follow as high nutrition risk.     Nutrition Related Findings:    K 3.3, Glu 175, Ca 8.2, Phos 2.3   Lasix   Wound Type:  (redness)       Current Nutrition Intake & Therapies:    Average Meal Intake: NPO  Average Supplements Intake: NPO  Diet NPO  ADULT TUBE FEEDING; Nasogastric; Standard without Fiber; Continuous; 10; Yes; 10; Q 8
Costella Dress [id number]
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)    Dysphagia due to recent stroke    Paroxysmal atrial fibrillation (HCC)    Chronic diastolic (congestive) heart failure (HCC)    Acute blood loss anemia    Peptic ulcer disease    Aspiration pneumonia of both lungs due to regurgitated food ([address]    Aphasia due to acute stroke [facility])    Dysarthria due to acute stroke [facility])       Measurements:  Wound 06/11/23 Sacrum Right  (Active)   Wound Image   06/23/23 1142   Wound Etiology Pressure Stage 2 06/23/23 1142   Dressing Status New dressing applied 06/23/23 1142   Wound Cleansed Cleansed with saline 06/23/23 1142   Dressing/Treatment Silicone border 61/10/95 1142   Wound Length (cm) 0.2 cm 06/23/23 1142   Wound Width (cm) 0.2 cm 06/23/23 1142   Wound Depth (cm) 0.1 cm 06/23/23 1142   Wound Surface Area (cm^2) 0.04 cm^2 06/23/23 1142   Wound Volume (cm^3) 0.004 cm^3 06/23/23 1142   Distance Tunneling (cm) 0 cm 06/23/23 1142   Tunneling Position ___ O'Clock 0 06/23/23 1142   Undermining Starts ___ O'Clock 0 06/23/23 1142   Undermining Ends___ O'Clock 0 06/23/23 1142   Undermining Maxium Distance (cm) 0 06/23/23 1142   Wound Assessment Pink/red 06/23/23 1142   Drainage Amount None 06/23/23 1142   Odor None 06/23/23 1142   Natalie-wound Assessment Blanchable erythema 06/23/23 1142   Margins Defined edges 06/23/23 1142   Wound Thickness Description not for Pressure Injury Partial thickness 06/23/23 1142   Number of days: 12       Response to treatment:  With complaints of pain. Pain Assessment:  Severity:  moderate  Quality of pain: sharp  Wound Pain Timing/Severity: constant  Premedicated: yes    Plan:     Plan of Care: Wound 06/11/23 Sacrum Right -Dressing/Treatment: Silicone border    Pt in bed. Nurse with pt. Has NG tube secured to nose with tape. Peeled back to assess. No wounds noted. Some irritation may be from tape but have to secure NG tube.  Recommend to use skin protectant wipe prior to tape in which nurse

## 2023-06-24 LAB
GLUCOSE BLD-MCNC: 179 MG/DL (ref 70–99)
GLUCOSE BLD-MCNC: 217 MG/DL (ref 70–99)
GLUCOSE BLD-MCNC: 265 MG/DL (ref 70–99)
GLUCOSE BLD-MCNC: 273 MG/DL (ref 70–99)
GLUCOSE BLD-MCNC: 288 MG/DL (ref 70–99)
GLUCOSE BLD-MCNC: 290 MG/DL (ref 70–99)

## 2023-06-24 PROCEDURE — 6360000002 HC RX W HCPCS: Performed by: PHYSICAL MEDICINE & REHABILITATION

## 2023-06-24 PROCEDURE — 6370000000 HC RX 637 (ALT 250 FOR IP): Performed by: INTERNAL MEDICINE

## 2023-06-24 PROCEDURE — 85014 HEMATOCRIT: CPT

## 2023-06-24 PROCEDURE — 6360000002 HC RX W HCPCS: Performed by: INTERNAL MEDICINE

## 2023-06-24 PROCEDURE — 2580000003 HC RX 258: Performed by: PHYSICAL MEDICINE & REHABILITATION

## 2023-06-24 PROCEDURE — 82962 GLUCOSE BLOOD TEST: CPT

## 2023-06-24 PROCEDURE — 94761 N-INVAS EAR/PLS OXIMETRY MLT: CPT

## 2023-06-24 PROCEDURE — 6370000000 HC RX 637 (ALT 250 FOR IP): Performed by: PHYSICAL MEDICINE & REHABILITATION

## 2023-06-24 PROCEDURE — C9113 INJ PANTOPRAZOLE SODIUM, VIA: HCPCS | Performed by: INTERNAL MEDICINE

## 2023-06-24 PROCEDURE — 2500000003 HC RX 250 WO HCPCS: Performed by: INTERNAL MEDICINE

## 2023-06-24 PROCEDURE — 94640 AIRWAY INHALATION TREATMENT: CPT

## 2023-06-24 PROCEDURE — 85018 HEMOGLOBIN: CPT

## 2023-06-24 PROCEDURE — 1280000000 HC REHAB R&B

## 2023-06-24 RX ADMIN — CEFEPIME 2000 MG: 2 INJECTION, POWDER, FOR SOLUTION INTRAVENOUS at 08:14

## 2023-06-24 RX ADMIN — INSULIN LISPRO 2 UNITS: 100 INJECTION, SOLUTION INTRAVENOUS; SUBCUTANEOUS at 12:17

## 2023-06-24 RX ADMIN — LORAZEPAM 0.25 MG: 0.5 TABLET ORAL at 09:52

## 2023-06-24 RX ADMIN — INSULIN LISPRO 1 UNITS: 100 INJECTION, SOLUTION INTRAVENOUS; SUBCUTANEOUS at 00:39

## 2023-06-24 RX ADMIN — AMLODIPINE BESYLATE 5 MG: 5 TABLET ORAL at 09:51

## 2023-06-24 RX ADMIN — GUAIFENESIN 200 MG: 100 SOLUTION ORAL at 16:30

## 2023-06-24 RX ADMIN — CEFEPIME 2000 MG: 2 INJECTION, POWDER, FOR SOLUTION INTRAVENOUS at 19:08

## 2023-06-24 RX ADMIN — IPRATROPIUM BROMIDE AND ALBUTEROL SULFATE 1 DOSE: 2.5; .5 SOLUTION RESPIRATORY (INHALATION) at 14:42

## 2023-06-24 RX ADMIN — PANTOPRAZOLE SODIUM 40 MG: 40 INJECTION, POWDER, FOR SOLUTION INTRAVENOUS at 09:51

## 2023-06-24 RX ADMIN — INSULIN LISPRO 2 UNITS: 100 INJECTION, SOLUTION INTRAVENOUS; SUBCUTANEOUS at 18:01

## 2023-06-24 RX ADMIN — LORAZEPAM 0.25 MG: 0.5 TABLET ORAL at 20:28

## 2023-06-24 RX ADMIN — HYDROMORPHONE HYDROCHLORIDE 0.25 MG: 1 INJECTION, SOLUTION INTRAMUSCULAR; INTRAVENOUS; SUBCUTANEOUS at 13:30

## 2023-06-24 RX ADMIN — GUAIFENESIN 200 MG: 100 SOLUTION ORAL at 20:38

## 2023-06-24 RX ADMIN — GUAIFENESIN 200 MG: 100 SOLUTION ORAL at 12:22

## 2023-06-24 RX ADMIN — IPRATROPIUM BROMIDE AND ALBUTEROL SULFATE 1 DOSE: 2.5; .5 SOLUTION RESPIRATORY (INHALATION) at 09:00

## 2023-06-24 RX ADMIN — GUAIFENESIN 200 MG: 100 SOLUTION ORAL at 09:51

## 2023-06-24 RX ADMIN — FUROSEMIDE 40 MG: 40 TABLET ORAL at 09:51

## 2023-06-24 RX ADMIN — HYDROMORPHONE HYDROCHLORIDE 0.25 MG: 1 INJECTION, SOLUTION INTRAMUSCULAR; INTRAVENOUS; SUBCUTANEOUS at 09:13

## 2023-06-24 RX ADMIN — HYDROMORPHONE HYDROCHLORIDE 0.25 MG: 1 INJECTION, SOLUTION INTRAMUSCULAR; INTRAVENOUS; SUBCUTANEOUS at 21:53

## 2023-06-24 RX ADMIN — PANTOPRAZOLE SODIUM 40 MG: 40 INJECTION, POWDER, FOR SOLUTION INTRAVENOUS at 20:38

## 2023-06-24 RX ADMIN — ACETAMINOPHEN 650 MG: 325 TABLET ORAL at 16:30

## 2023-06-24 RX ADMIN — HYDROCORTISONE ACETATE 25 MG: 25 SUPPOSITORY RECTAL at 09:51

## 2023-06-24 RX ADMIN — HYDROMORPHONE HYDROCHLORIDE 0.25 MG: 1 INJECTION, SOLUTION INTRAMUSCULAR; INTRAVENOUS; SUBCUTANEOUS at 18:02

## 2023-06-24 RX ADMIN — HYDROCORTISONE ACETATE 25 MG: 25 SUPPOSITORY RECTAL at 20:37

## 2023-06-24 RX ADMIN — INSULIN LISPRO 2 UNITS: 100 INJECTION, SOLUTION INTRAVENOUS; SUBCUTANEOUS at 06:31

## 2023-06-24 RX ADMIN — ATORVASTATIN CALCIUM 40 MG: 40 TABLET, FILM COATED ORAL at 20:37

## 2023-06-24 ASSESSMENT — PAIN SCALES - GENERAL
PAINLEVEL_OUTOF10: 6
PAINLEVEL_OUTOF10: 0

## 2023-06-24 ASSESSMENT — PAIN SCALES - WONG BAKER
WONGBAKER_NUMERICALRESPONSE: 0
WONGBAKER_NUMERICALRESPONSE: 6
WONGBAKER_NUMERICALRESPONSE: 6

## 2023-06-24 ASSESSMENT — PAIN DESCRIPTION - LOCATION: LOCATION: GENERALIZED

## 2023-06-24 NOTE — PROGRESS NOTES
06/19/23 0848   Encounter Summary   Encounter Overview/Reason  Spiritual/Emotional Needs   Service Provided For: Patient   Referral/Consult From: [address] Children;Family members   Last Encounter  06/19/23  (Patient not able to have long conversations but states that she is [address]. Meri Fuller will continue to support and place on Scientologist communion list)   Complexity of Encounter Low   Begin Time 0842   End Time  0850   Total Time Calculated 8 min   Encounter    Type Initial Screen/Assessment   Spiritual/Emotional needs   Type Spiritual Support   Rituals, Rites and Sacraments   Type Blessings; Sacrament (Comment); Rite (Comment)  (Please have Yolanda Lemon and volunteer visit)   Assessment/Intervention/Outcome   Assessment Passive;Peaceful   Intervention Discussed belief system/Samaritan practices/nba;Nurtured Hope;Prayer (assurance of)/[place];Sustaining Presence/Ministry of presence   Outcome Encouraged;Engaged in conversation;Expressed Gratitude   Plan and Referrals   Plan/Referrals Continue to visit, (comment)  (Communion and anointing)
4 Eyes Skin Assessment     NAME:  Rut Tierney  YOB: 1934  MEDICAL RECORD NUMBER:  [id number]    The patient is being assessed for  Transfer to New Unit    I agree that at least one RN has performed a thorough Head to Toe Skin Assessment on the patient. ALL assessment sites listed below have been assessed. Areas assessed by both nurses:    Head, Face, Ears, Shoulders, Back, Chest, Arms, Elbows, Hands, Sacrum. Buttock, Coccyx, Ischium, and Legs. Feet and Heels        Does the Patient have a Wound?  No noted wound(s)       Wong Prevention initiated by RN: No  Wound Care Orders initiated by RN: No    Pressure Injury (Stage 3,4, Unstageable, DTI, NWPT, and Complex wounds) if present, place Wound referral order by RN under : No    New Ostomies, if present place, Ostomy referral order under : No     Nurse 1 eSignature: Electronically signed by Trudy Elizabeth RN on 6/16/23 at 7:00 PM EDT    **SHARE this note so that the co-signing nurse can place an eSignature**    Nurse 2 eSignature: Electronically signed by Heath Vaughan RN on 6/16/23 at 8:50 PM EDT
After consulting with Dr Alan Bond and Dr Jorge L Fernandes. They both want the NG re-inserted as her anatomy is not compatible with PEG placement at this time. 2 attempts made to place the NG in both nares were unsuccessful The left nare I was unable to pass it through to the abdulkadir-pharynx. The right nare I was able to insert the tube to about 35 cm before the patient coughed it back out of her mouth. Nursing supervisor to attempt to insert. Nursing Supervisor was able to successfully insert the NG to 53 CM via the right Nare. Will do STAT portable CXR to confirm placement prior to use.
[facility] UNIT  SPEECH/LANGUAGE PATHOLOGY      [x] Daily           [] Discharge    Patient:Nita Melendez      :1934  TLI:[id number]  Rehab Dx/Hx: Cerebral infarction, unspecified [I63.9]  Acute CVA (cerebrovascular accident) ([address] [I63.9]   Allergies   Allergen Reactions    Motrin [Ibuprofen]     Tramadol      Precautions: ; Restrictions/Precautions: Fall Risk, General Precautions, NPO (NG tube, 24/7 sitter)          Home Situation/IADL:   Social/Functional History  Lives With: Son, Family  Type of Home: House  Home Layout: One level  Home Access: Stairs to enter with rails  Entrance Stairs - Number of Steps: 2  Entrance Stairs - Rails: Left  Bathroom Shower/Tub: Tub/Shower unit  Bathroom Toilet: Standard  Bathroom Equipment: Shower chair  Bathroom Accessibility: Accessible  Home Equipment: Walker, rolling, Cane  Has the patient had two or more falls in the past year or any fall with injury in the past year?: No  ADL Assistance: Independent  Homemaking Assistance: Needs assistance  Ambulation Assistance: Independent  Transfer Assistance: Independent  Active : No  Patient's  Info: Hasn't driven in over a year--eye  Macular degeneration. son drives. Education: very limited education  Occupation: Retired  Type of Occupation: raised 5 kids and worked at [employer]: enjoys walking, cooking, cleaning. No pets. Son manages medications and finances. [employer] and [employer].  [facility] being outside. IADL Comments: Pt does not read or write. Kids: Junaid Vega Sandy Ambrosio Kennel  Additional Comments: Per son pt sleeps in a bed or in an easy chair if legs bothering her. Pt frequently in pain will get fidgety if uncomfortable or put ice packs on legs.       Date of Admit: 2023  Room #: 1006/1006-A     ST Number of Minutes/Billable Intervention  Cog/Memory Deficits     Aphasia/Language 76    Dysarthria/Speech
Comprehensive Nutrition Assessment    Type and Reason for Visit:  Reassess    Nutrition Recommendations/Plan:   Continue current EN: osmolite 1.5 at 45 ml/hr continuous feeding  Will continue to follow up during stay     Malnutrition Assessment:  Malnutrition Status:  Severe malnutrition (06/17/23 1131)    Context:  Acute Illness     Findings of the 6 clinical characteristics of malnutrition:  Energy Intake:  75% or less of estimated energy requirements for 7 or more days  Weight Loss:  Greater than 2% over 1 week     Body Fat Loss: Moderate body fat loss Orbital, Buccal region   Muscle Mass Loss: Moderate muscle mass loss Temples (temporalis), Clavicles (pectoralis & deltoids)  Fluid Accumulation:  Mild Extremities   Strength:  Not Performed    Nutrition Assessment:    Remains NPO with EN to meet needs at this time. Unable to safely resume diet with dysphagia, followed by speech therapy. Placement of NG evaluated today per nursing. Noted GI rec for continued NG no plan for Gtube at this time, diffcult insertion due to altered anatomy, hoping will be able to resume diet. Current tube feeding with osmolite 1.5 at 45 ml/hr meeting needs. Will continue to follow at high nutrition risk at this time. Nutrition Related Findings:    hx PUD, gastric perforation and Billroth II, remains with sitter in room Wound Type:  (redness)       Current Nutrition Intake & Therapies:    Average Meal Intake: NPO  Average Supplements Intake: NPO  Current Tube Feeding (TF) Orders:  Feeding Route: Nasogastric  Formula:  Other Tube Feeding (Comment) (osmolite 1.5)  Schedule: Continuous  Feeding Regimen: 45 ml/hr  Additives/Modulars: None  Water Flushes: 200 ml every 6 hours  Current TF & Flush Orders Provides: 1620 calories, 67 g protein, 822 ml free water in TF (1622 ml total free water)  Goal TF & Flush Orders Provides: current goal    Anthropometric Measures:  Height: 5' (152.4 cm)  Ideal Body Weight (IBW): 100 lbs (45 kg)
DOING BETTER AWAKE  AND APHASIC NO ACTIVE GIT BLEEDING HAD A SMALL SMEAR OF STOOL DARK IN COLOR  NG TUBE REINSERTED YESTERDAY  VITALS STABLE   LABS NOTED HB  8.3  WILL CPM AND IF NO  GIT REBLEED   MAY RESTART AC F/U H/H  PT WILL NEED COLONOSCOPY LATER AS OUTPT ONCE RECUPERATES FROM CVA
DOING FAIR NO ACTIVE OR GROSS GIT BLEEDING TOLERATING TUBE FEEDINGS WELL  VITALS STABLE   LABS NOTED HB STABLE AT 9.3   WILL CPM MAY RESTART ANTICOAGULANTS FROM TODAY SINCE HB STABLE WITH NO ACTIVE BLEEDING D/W DAUGHTER PT WILL NEED OUTPT COLONOSCOPY IN 3 MONTHS AFTER RECUPERATES FROM HER CVA
Dr Javi Garces updated telephonically about fevers. Will continue current treatment regimen.
IV in left forearm infiltrated. Red, swelling noted and patient noded yes to pain. IV removed and 22g placed in right forearm, patient tolerated without reaction.
Melani Shipley    : 1934  Acct #: [de-identified]  MRN: [id number]              PM&R Progress Note      Admitting diagnosis: Acute CVA ( [address])     Comorbid diagnoses impacting rehabilitation: Right hemiparesis, dysarthria, dysphagia, aphasia, paroxysmal atrial fibrillation, aspiration pneumonia, chronic diastolic congestive heart failure, acute blood loss anemia with a GJ anastomosis ulcer     Chief complaint: Her family feels she is demonstrating behaviors suggesting anxiety. Prior (baseline) level of function: Independent.     Current level of function:         Current  IRF-DAYDAY and Goals:   Occupational Therapy:    Short Term Goals  Time Frame for Short Term Goals: LTGs=STGs :   Long Term Goals  Time Frame for Long Term Goals : 14 days until D/C  Long Term Goal 1: Pt will complete oral hygiene and self-feeding with SBA  Long Term Goal 2: Pt will complete UB dressing + bathing with Min A  Long Term Goal 3: Pt will complete LB dressing and bathing with Min A  Long Term Goal 4: Pt will complete all aspecets of toileting with Min A  Long Term Goal 5: Pt will complete all functional transfers with Min A  Additional Goals?: Yes  Long Term Goal 6: Pt will complete donning/ doffing foot wear using AE PRN with Min A  Long Term Goal 7: Pt will complete a 3 minute standing functional activity to improve strength, endurance and performance with ADLs with Min A :                                       Eating: Eating  Assistance Needed: Partial/moderate assistance  Comment: Not applicable, tube feed  Reason if not Attempted: Not applicable  CARE Score: 9  Discharge Goal: Supervision or touching assistance       Oral Hygiene: Oral Hygiene  Assistance Needed: Partial/moderate assistance  Comment: required assist to control the swab in mouth  CARE Score: 3  Discharge Goal: Supervision or touching assistance    UB/LB Bathing: Shower/Bathe Self  Assistance Needed: Substantial/maximal assistance  Comment: Pt able to wash
Ngt ascertained to be in situ with an x-ray. Residual volume 20ml. Meds given and continuous NGT feeds resumed as ordered.
Notified primary nurse of blood pressure reading of 143/70.
Occupational Therapy    Physical Rehabilitation: OCCUPATIONAL THERAPY     [x] daily progress note       [] discharge       Patient Name:  Humera Antonio   :  1934 MRN: [id number]  Room:  [address] [place] [address] Date of Admission: 2023  Rehabilitation Diagnosis:   Cerebral infarction, unspecified [I63.9]  Acute CVA (cerebrovascular accident) ([facility] [I63.9]       Date 2023       Day of ARU Week:  5   Time IN/OUT 1135/1150   Individual Tx Minutes 15   Group Tx Minutes    Co-Treat Minutes    Concurrent Tx Minutes    TOTAL Tx Time Mins 15   Variance Time    Variance Time []   Refusal due to:     []   Medical hold/reason:    []   Illness   []   Off Unit for test/procedure  []   Extra time needed to complete task  []   Therapeutic need  []   Other (specify):   Restrictions Restrictions/Precautions: Fall Risk, General Precautions, NPO (NG tube, 24/7 sitter)         Communication with other providers: []   OK to see per nursing:     []   Spoke with team member regarding:      Subjective observations and cognitive status: Pt in recliner upon therapist arrival. Pt exhibiting cognitive and behavioral reactions to simple instructions throughout session. Pain level/location:    /10       Location: Pt shook head when asked if she was in pain. No clear response for pain level or area determined.    Discharge recommendations  Anticipated discharge date:  TBD  Destination: []home alone   []home alone w assist prn   [] home w/ family    [] Continuous supervision       []SNF    [] Assisted living     [] Other:   Continued therapy: []HHC OT  []OUTPATIENT  OT   [] No Further OT  Equipment needs: TBD       Bed Mobility:           [x]   Pt received out of bed   Rolling R/L:    Scooting:    Supine --> Sit:    Sit --> Supine:      Transfers:    Sit--> Stand:    Stand --> Sit:     Stand-Pivot:     Other:  Pt remained in recliner for entirety of 15 min session  Assistive device required for transfer:         Functional Mobility:
[facility] UNIT  SPEECH/LANGUAGE PATHOLOGY      [x] Daily           [] Discharge    Patient:Nita Jaeger      :1934  BYS:[id number]  Rehab Dx/Hx: Cerebral infarction, unspecified [I63.9]  Acute CVA (cerebrovascular accident) ([address] [I63.9]   Allergies   Allergen Reactions    Motrin [Ibuprofen]     Tramadol      Precautions: ; Restrictions/Precautions: Fall Risk, General Precautions, NPO (aphasia--responds best via y/n questions and follows commands with tactile cues to initiate. NG Tube. HOB at 30 degrees. Pt does not read or write.); Aphasia--responds to y/n questions and simple commands with tactile cues to initiate. Home Situation/IADL:   Social/Functional History  Lives With: Son, Family  Type of Home: House  Home Layout: One level  Home Access: Stairs to enter with rails  Entrance Stairs - Number of Steps: 2  Entrance Stairs - Rails: Left  Bathroom Shower/Tub: Tub/Shower unit  Bathroom Toilet: Standard  Bathroom Equipment: Shower chair  Bathroom Accessibility: Accessible  Home Equipment: Walker, rolling, Cane  Has the patient had two or more falls in the past year or any fall with injury in the past year?: No  ADL Assistance: Independent  Homemaking Assistance: Needs assistance  Ambulation Assistance: Independent  Transfer Assistance: Independent  Active : No  Patient's  Info: Hasn't driven in over a year--eye  Macular degeneration. son drives. Education: very limited education  Occupation: Retired  Type of Occupation: raised 5 kids and worked at [employer]: enjoys walking, cooking, cleaning. No pets. Son manages medications and finances. [employer] and [employer].  [employer] being outside. IADL Comments: Pt does not read or write. Kids: Junaid Vinson Sandy Ettie Finn  Additional Comments: Per son pt sleeps in a bed or in an easy chair if legs bothering her.   Pt frequently in pain will get fidgety if
PT KNOWN TO ME CHART REVIEWED AND PT EXAMINED PT ADMITTED WITH CVA ON ASA AND ELIQUIS YESTERDAY NOTED TO HAVE RECTAL BLEEDING AND AGAIN A SMALL AMOUNT THIS AM RECEIVING TUBE FEEDINGS ON PROTONIX  RECENTLY HAD EGD AT [facility] WITH CLEAN BASE ANASTOMOTIC ULCER  VITALS STABLE   LABS NOTED HB 8.2   WILL CPM F/U H/H HOLD ANTICOAGULANTS IF CONTINUE TO BLEED WILL NEED REPEAT EGD / COLONOSCOPY CTA / BLEEDING SCAN LATER IF NEEDED D/W LIZ Bliss
Patient very aggitated this morning. Pulling at NG tube and trying to get our of chair.
Patient with PT when this nurse noticed NG looked longer then should be, measurement is suppose to be 60 and NG was at 39. Stopped feeding immediately and called Dr. Andrea Lawton. Per Dr. Andrea Lawton advance to wear tube should be and get stat cxr. Tube advanced, patient tolerated very well without behaviors. Awaiting cxr to confirm placement. When tape was replaced a reddened area noted possible stage 2 on the nose under tape.  Wound care consulted, skin prep applied
Physical Rehabilitation: OCCUPATIONAL THERAPY     [x] daily progress note       [] discharge       Patient Name:  Sonya Modi   :  1934 MRN: [id number]  Room:  [address] [place] [address] Date of Admission: 2023  Rehabilitation Diagnosis:   Cerebral infarction, unspecified [I63.9]  Acute CVA (cerebrovascular accident) ([facility] [I63.9]       Date 2023       Day of ARU Week:  1   Time IN//1030   Individual Tx Minutes 60   Group Tx Minutes    Co-Treat Minutes    Concurrent Tx Minutes    TOTAL Tx Time Mins 60   Variance Time    Variance Time []   Refusal due to:     []   Medical hold/reason:    []   Illness   []   Off Unit for test/procedure  []   Extra time needed to complete task  []   Therapeutic need  []   Other (specify):   Restrictions Restrictions/Precautions: Fall Risk, General Precautions, NPO (aphasia--responds best via y/n questions and follows commands with tactile cues to initiate. NG Tube. HOB at 30 degrees. Pt does not read or write.)         Communication with other providers: [x]   OK to see per nursing:     []   Spoke with team member regarding:      Subjective observations and cognitive status: Patient in dining area with 1:1 sitter following a PT session, patient requires encouragement to participate in this therapy session     Pain level/location:    /10       Location: per patient no pain noted    Discharge recommendations  Anticipated discharge date:    Destination: []home alone   []home alone w assist prn   [] home w/ family    [x] Continuous supervision       []SNF    [] Assisted living     [] Other:   Continued therapy: [x]HHC OT  []OUTPATIENT  OT   [] No Further OT  Equipment needs: Sonya Modi requires a 3 in 1 bedside commode due to being unable to use the toilet within the home  and confined to a single room and to one level of the home.         ADLs:      Oral Hygiene: Oral Hygiene  Assistance Needed: Partial/moderate assistance  Comment: MIn A for thoroughness using
Physical Therapy    [x] daily progress note       [] discharge       Patient Name:  Bradford Alarcon   :  1934 MRN: [id number]  Room:  [address] [place] [address] Date of Admission: 2023  Rehabilitation Diagnosis:   Cerebral infarction, unspecified [I63.9]  Acute CVA (cerebrovascular accident) ([facility] [I63.9]       Date 2023       Day of ARU Week:  5   Time IN/OUT 1315/1405   Individual Tx Minutes 50   Group Tx Minutes    Co-Treat Minutes    Concurrent Tx Minutes    TOTAL Tx Time Mins 50   Variance Time -10   Variance Time []   Refusal due to:     []   Medical hold/reason:    []   Illness   []   Off Unit for test/procedure  []   Extra time needed to complete task  []   Therapeutic need  [x]   Other (specify): extra time with OT and SLP    Restrictions Restrictions/Precautions  Restrictions/Precautions: Fall Risk, General Precautions, NPO (NG tube, 24/7 sitter)      Interdisciplinary communication [x]   Cleared for therapy per nursing     []   RN notified about issues during session  []   RN updated on pt performance  []   Spoke with   []   Spoke with OT  []   Spoke with MD  []   Other:    Subjective observations and cognitive status: Pt resting in recliner with NG tube in place, daughter and personal  in room. Pt with audible breathing through her mouth, nods head when toileting activity was offered, has significant bladder incontinence while in chair.      Pain level/location: Limited by expressive aphasia, no pain behavior observed at rest and with activities    Discharge recommendations  Anticipated discharge date:  2023  Destination: []home alone   []home alone with assist PRN     [x] home w/ family      [] Continuous supervision  []SNF    [] Assisted living     [] Other:  Continued therapy: [x]HHC PT  []OUTPATIENT PT   [] No Further PT  []SNF PT  Caregiver training recommended: [x]Yes  [] No   Equipment needs: considering hospital bed, 2WW, and transport chair      PT IRF-DAYDAY
Physical Therapy    [x] daily progress note       [] discharge       Patient Name:  sJ Montana   :  1934 MRN: [id number]  Room:  [address] [place] [address] Date of Admission: 2023  Rehabilitation Diagnosis:   Cerebral infarction, unspecified [I63.9]  Acute CVA (cerebrovascular accident) ([facility] [address] [facility] [I63.9]       Date 2023       Day of ARU Week:  4   Time IN//930   Individual Tx Minutes 60   Group Tx Minutes    Co-Treat Minutes    Concurrent Tx Minutes    TOTAL Tx Time Mins 60   Variance Time    Variance Time []   Refusal due to:     []   Medical hold/reason:    []   Illness   []   Off Unit for test/procedure  []   Extra time needed to complete task  []   Therapeutic need  []   Other (specify):   Restrictions Restrictions/Precautions  Restrictions/Precautions: Fall Risk, General Precautions, NPO (NG tube, 24/7 sitter)      Interdisciplinary communication [x]   Cleared for therapy per nursing     []   RN notified about issues during session  []   RN updated on pt performance  []   Spoke with   []   Spoke with OT  []   Spoke with MD  []   Other:    Subjective observations and cognitive status: Pt in low nino's position, would intermittently open eyes but does not track, with apparent resting tremors in mouth and RUE, NG tube in place and actively receiving IV bolus, personal  at bedside and reports pt was up all night. When asked about toileting need, pt nods head and then immediately shakes head. Pt does not verbalize at all. Pedal edema observed (L>R).     Pain level/location:    /10       Location: Pt nods head that her feet hurts when asked and touched    Discharge recommendations  Anticipated discharge date:  TBD   Destination: []home alone   []home alone with assist PRN     [] home w/ family      [] Continuous supervision  []SNF    [] Assisted living     [] Other:  Continued therapy: []C PT  []OUTPATIENT PT   [] No Further PT  []SNF PT  Caregiver training recommended:
Physical Therapy  [facility]    Chart Review:  No past medical history on file. No past surgical history on file. Fall History:   Social History:  Social/Functional History  Lives With: Son, Family  Type of Home: House  Home Layout: One level  Home Access: Stairs to enter with rails  Entrance Stairs - Number of Steps: 2  Entrance Stairs - Rails: Left  Bathroom Shower/Tub: Tub/Shower unit  Bathroom Toilet: Standard  Bathroom Equipment: Shower chair  Bathroom Accessibility: Accessible  Home Equipment: Donohue Dice, rolling, Cane  Has the patient had two or more falls in the past year or any fall with injury in the past year?: No  ADL Assistance: Independent  Homemaking Assistance: Needs assistance  Ambulation Assistance: Independent  Transfer Assistance: Independent  Active : Yes  Occupation: Retired  Additional Comments: PLOF taken from acute care assessment. Pt is non-verbal and this information will need to be verified with family    Restrictions:  Restrictions/Precautions  Restrictions/Precautions: Fall Risk, General Precautions, NPO (NG tube, 24/7 sitter)            Pain Level: 0  Pain Location: Leg    Objective:  Orientation  Orientation Level: Unable to assess           Hearing  Hearing:  (pt appears to hear PT commands turns head, sometimes needs eye contact for clarification)    Sensation:  Sensation  Overall Sensation Status: WFL (appears intact to LT, but detailed assessment unable due to communication issues)    Observation:   Observation/Palpation  Posture: Poor  Observation: Pt sitting in recliner with CNA unpon arrival with NG -tube.  Pt demonstrated anterior flexed trunk    ROM:   PROM RLE (degrees)  RLE General PROM: some limitations but generally WFL     PROM LLE (degrees)  LLE General PROM: some limitations but generally WFL                    Strength:    Strength RLE  Comment: grossly 3+ to 4/5. pt has inconsistent ability to follow commands for MMT  Strength
Preliminary ngt x-ray in, called Dr Brett Duong on his input, since it recommends advancement of the ngt. He agreed to the recommendation and asked for a repeat ngt x ray before giving meds and resuming ngt feeds. NGT advanced and x ray stat ordered. Awaiting the x ray repeat.
Pt due to the H&H blood draw, was reported that the last one was done at 1730. Called the lab phlebotomist for the blood draw.
Pulmonary and Critical Care  Progress Note    Subjective: The patient has improved. On RA. Shortness of breath better. Chest pain none. Addressing respiratory complaints Patient is negative for  hemoptysis and cyanosis. CONSTITUTIONAL:  negative for fevers and chills. Past Medical History:     has no past medical history on file. has no past surgical history on file. reports that she has never smoked. She has never been exposed to tobacco smoke. She has never used smokeless tobacco.  Family history:  family history is not on file. Allergies   Allergen Reactions    Motrin [Ibuprofen]     Tramadol      Social History:    Reviewed; no changes    Objective:   PHYSICAL EXAM:        VITALS:  /62   Pulse 87   Temp 97.3 °F (36.3 °C) (Axillary)   Resp 16   Ht 5' (1.524 m)   Wt 112 lb 14 oz (51.2 kg)   SpO2 98%   BMI 22.04 kg/m²     24HR INTAKE/OUTPUT:    Intake/Output Summary (Last 24 hours) at 6/23/2023 1009  Last data filed at 6/23/2023 0610  Gross per 24 hour   Intake --   Output 1650 ml   Net -1650 ml       CONSTITUTIONAL:  awake. LUNGS:Decreased BS, occ basilar crackles. CARDIOVASCULAR:  normal S1 and S2 and negative JVD  ABD:Abdomen soft, non-tender. BS normal. No masses,  No organomegaly  NEURO:Alert.   DATA:    CBC:  Recent Labs     06/21/23 0448 06/22/23  0154 06/22/23  2147 06/23/23  0609   WBC 15.6* 15.8*  --   --    RBC 3.52* 3.64*  --   --    HGB 8.2*  8.1* 8.3*  8.4* 8.1* 8.0*   HCT 31.6*  29.2* 31.3*  30.6* 28.8* 28.9*    404  --   --    MCV 89.8 86.0  --   --    MCH 23.3* 22.8*  --   --    MCHC 25.9* 26.5*  --   --    RDW 16.6* 16.2*  --   --    SEGSPCT 88.4* 89.2*  --   --       BMP:  Recent Labs     06/20/23  1533 06/21/23  0448 06/22/23  0154    136 136   K 4.7 4.8 4.9   CL 98* 98* 97*   CO2 28 28 28   BUN 25* 26* 24*   CREATININE 0.7 0.7 0.7   CALCIUM 7.8* 7.8* 7.9*   GLUCOSE 276* 262* 229*      ABG:  No results for input(s): PH, PO2ART, NRN8TBS, HCO3,
Pulmonary and Critical Care  Progress Note    Subjective: The patient is better. Sitting in the chair. On RA. Shortness of breath better. Chest pain none. Addressing respiratory complaints Patient is negative for  hemoptysis and cyanosis. CONSTITUTIONAL:  negative for fevers and chills. Past Medical History:     has no past medical history on file. has no past surgical history on file. reports that she has never smoked. She has never been exposed to tobacco smoke. She has never used smokeless tobacco.  Family history:  family history is not on file. Allergies   Allergen Reactions    Motrin [Ibuprofen]     Tramadol      Social History:    Reviewed; no changes    Objective:   PHYSICAL EXAM:        VITALS:  /70   Pulse 97   Temp 98 °F (36.7 °C) (Axillary)   Resp 20   Ht 5' (1.524 m)   Wt 115 lb 15.4 oz (52.6 kg)   SpO2 97%   BMI 22.65 kg/m²     24HR INTAKE/OUTPUT:    Intake/Output Summary (Last 24 hours) at 6/22/2023 0954  Last data filed at 6/21/2023 1630  Gross per 24 hour   Intake 1472 ml   Output --   Net 1472 ml       CONSTITUTIONAL:  awake. LUNGS: Moving air better. CARDIOVASCULAR:  normal S1 and S2 and negative JVD  ABD:Abdomen soft, non-tender. BS normal. No masses,  No organomegaly  NEURO:Alert. DATA:    CBC:  Recent Labs     06/19/23  1928 06/21/23  0448 06/22/23  0154   WBC 19.9* 15.6* 15.8*   RBC 4.13* 3.52* 3.64*   HGB 9.8* 8.2*  8.1* 8.3*  8.4*   HCT 38.0 31.6*  29.2* 31.3*  30.6*    289 404   MCV 92.0 89.8 86.0   MCH 23.7* 23.3* 22.8*   MCHC 25.8* 25.9* 26.5*   RDW 16.7* 16.6* 16.2*   SEGSPCT  --  88.4* 89.2*      BMP:  Recent Labs     06/20/23  1533 06/21/23  0448 06/22/23  0154    136 136   K 4.7 4.8 4.9   CL 98* 98* 97*   CO2 28 28 28   BUN 25* 26* 24*   CREATININE 0.7 0.7 0.7   CALCIUM 7.8* 7.8* 7.9*   GLUCOSE 276* 262* 229*      ABG:  No results for input(s): PH, PO2ART, BON4FIN, HCO3, BEART, O2SAT in the last 72 hours.   Lab Results   Component Value
Pulmonary and Critical Care  Progress Note    Subjective: The patient is better. Sitting in the chair. Shortness of breath better. Chest pain none. Addressing respiratory complaints Patient is negative for  hemoptysis and cyanosis. CONSTITUTIONAL:  negative for fevers and chills. Past Medical History:     has no past medical history on file. has no past surgical history on file. reports that she has never smoked. She has never been exposed to tobacco smoke. She has never used smokeless tobacco.  Family history:  family history is not on file. Allergies   Allergen Reactions    Motrin [Ibuprofen]     Tramadol      Social History:    Reviewed; no changes    Objective:   PHYSICAL EXAM:        VITALS:  /64   Pulse 90   Temp 97.3 °F (36.3 °C) (Oral)   Resp 18   Ht 5' (1.524 m)   Wt 114 lb 6.7 oz (51.9 kg)   SpO2 97%   BMI 22.35 kg/m²     24HR INTAKE/OUTPUT:    Intake/Output Summary (Last 24 hours) at 6/24/2023 1136  Last data filed at 6/24/2023 0536  Gross per 24 hour   Intake 2290 ml   Output 1250 ml   Net 1040 ml       CONSTITUTIONAL:  awake. LUNGS: Moving air better. CARDIOVASCULAR:  normal S1 and S2 and negative JVD  ABD:Abdomen soft, non-tender. BS normal. No masses,  No organomegaly  NEURO:Alert. DATA:    CBC:  Recent Labs     06/22/23  0154 06/22/23  2147 06/23/23  0609 06/23/23  1830   WBC 15.8*  --   --   --    RBC 3.64*  --   --   --    HGB 8.3*  8.4* 8.1* 8.0* 9.3*   HCT 31.3*  30.6* 28.8* 28.9* 33.2*     --   --   --    MCV 86.0  --   --   --    MCH 22.8*  --   --   --    MCHC 26.5*  --   --   --    RDW 16.2*  --   --   --    SEGSPCT 89.2*  --   --   --       BMP:  Recent Labs     06/22/23  0154      K 4.9   CL 97*   CO2 28   BUN 24*   CREATININE 0.7   CALCIUM 7.9*   GLUCOSE 229*      ABG:  No results for input(s): PH, PO2ART, FPH2WIJ, HCO3, BEART, O2SAT in the last 72 hours.   Lab Results   Component Value Date    PROBNP 2,277 (H) 06/21/2023    PROBNP 6,899 (H)
Pulmonary and Critical Care  Progress Note    Subjective: The patient is having upper resp secretions. Shortness of breath is unchanged  Chest pain none  Addressing respiratory complaints Patient is negative for  hemoptysis and cyanosis  CONSTITUTIONAL:  negative for fevers and chills      Past Medical History:     has no past medical history on file. has no past surgical history on file. reports that she has never smoked. She has never been exposed to tobacco smoke. She has never used smokeless tobacco.  Family history:  family history is not on file. Allergies   Allergen Reactions    Motrin [Ibuprofen]     Tramadol      Social History:    Reviewed; no changes    Objective:   PHYSICAL EXAM:        VITALS:  /69   Pulse 93   Temp 97.9 °F (36.6 °C) (Oral)   Resp 20   Ht 5' (1.524 m)   Wt 115 lb 15.4 oz (52.6 kg)   SpO2 97%   BMI 22.65 kg/m²     24HR INTAKE/OUTPUT:    Intake/Output Summary (Last 24 hours) at 6/21/2023 1036  Last data filed at 6/21/2023 0852  Gross per 24 hour   Intake 30 ml   Output --   Net 30 ml       CONSTITUTIONAL:  awake. LUNGS:  coarse sounds heard  CARDIOVASCULAR:  normal S1 and S2 and negative JVD  ABD:Abdomen soft, non-tender. BS normal. No masses,  No organomegaly  NEURO:Alert. DATA:    CBC:  Recent Labs     06/19/23 0453 06/19/23 1928 06/21/23 0448   WBC 26.6* 19.9* 15.6*   RBC 4.03* 4.13* 3.52*   HGB 9.4* 9.8* 8.2*  8.1*   HCT 36.3* 38.0 31.6*  29.2*    351 289   MCV 90.1 92.0 89.8   MCH 23.3* 23.7* 23.3*   MCHC 25.9* 25.8* 25.9*   RDW 16.6* 16.7* 16.6*   SEGSPCT 92.9*  --  88.4*      BMP:  Recent Labs     06/19/23 1928 06/20/23  1533 06/21/23  0448    136 136   K 5.1 4.7 4.8    98* 98*   CO2 25 28 28   BUN 22 25* 26*   CREATININE 0.7 0.7 0.7   CALCIUM 7.8* 7.8* 7.8*   GLUCOSE 228* 276* 262*      ABG:  No results for input(s): PH, PO2ART, NHR8BTB, HCO3, BEART, O2SAT in the last 72 hours.   Lab Results   Component Value Date    PROBNP 2,277
Q6 hour H&H timed for 1730 Per Dr. Riaz Boyer ok to keep that time.
Rectal bleed noticed (dark blood with clots). Dr. Malvin Edwards notified.
Sarina Watson    : 1934  Acct #: [de-identified]  MRN: [id number]              PM&R Progress Note      Admitting diagnosis: ***    Comorbid diagnoses impacting rehabilitation: ***    Chief complaint: ***    Prior (baseline) level of function: Independent.     Current level of function:         Current  IRF-DAYDAY and Goals:   Occupational Therapy:    Short Term Goals  Time Frame for Short Term Goals: LTGs=STGs :   Long Term Goals  Time Frame for Long Term Goals : 14 days until D/C  Long Term Goal 1: Pt will complete oral hygiene and self-feeding with SBA  Long Term Goal 2: Pt will complete UB dressing + bathing with Min A  Long Term Goal 3: Pt will complete LB dressing and bathing with Min A  Long Term Goal 4: Pt will complete all aspecets of toileting with Min A  Long Term Goal 5: Pt will complete all functional transfers with Min A  Additional Goals?: Yes  Long Term Goal 6: Pt will complete donning/ doffing foot wear using AE PRN with Min A  Long Term Goal 7: Pt will complete a 3 minute standing functional activity to improve strength, endurance and performance with ADLs with Min A :                                       Eating: Eating  Assistance Needed: Partial/moderate assistance  Comment: Not applicable, tube feed  Reason if not Attempted: Not applicable  CARE Score: 9  Discharge Goal: Supervision or touching assistance       Oral Hygiene: Oral Hygiene  Assistance Needed: Partial/moderate assistance  Comment: MIn A for thoroughness using swab to wipe mouth and clean tongue  CARE Score: 3  Discharge Goal: Supervision or touching assistance    UB/LB Bathing: Shower/Bathe Self  Assistance Needed: Substantial/maximal assistance  Comment: Max A patient requires assist to wash back, buttocks, periarea and after trialiung LHS patient required assist for thoroughness, required max cues entire washup up for sequencing and not repeating same body part, max cues to initiate task and max cues to stay on task  CARE
Sonya Modi    : 1934  Acct #: [de-identified]  MRN: [id number]              PM&R Progress Note      Admitting diagnosis: Acute CVA ( [address])     Comorbid diagnoses impacting rehabilitation: Right hemiparesis, dysarthria, dysphagia, aphasia, paroxysmal atrial fibrillation, aspiration pneumonia, chronic diastolic congestive heart failure, acute blood loss anemia with a GJ anastomosis ulcer    Chief complaint: No verbalized complaints. She does not appear sedated with the medication changes made yesterday. Prior (baseline) level of function: Independent.     Current level of function:         Current  IRF-DAYDAY and Goals:   Occupational Therapy:    Short Term Goals  Time Frame for Short Term Goals: LTGs=STGs :   Long Term Goals  Time Frame for Long Term Goals : 14 days until D/C  Long Term Goal 1: Pt will complete oral hygiene and self-feeding with SBA  Long Term Goal 2: Pt will complete UB dressing + bathing with Min A  Long Term Goal 3: Pt will complete LB dressing and bathing with Min A  Long Term Goal 4: Pt will complete all aspecets of toileting with Min A  Long Term Goal 5: Pt will complete all functional transfers with Min A  Additional Goals?: Yes  Long Term Goal 6: Pt will complete donning/ doffing foot wear using AE PRN with Min A  Long Term Goal 7: Pt will complete a 3 minute standing functional activity to improve strength, endurance and performance with ADLs with Min A :                                       Eating: Eating  Assistance Needed: Partial/moderate assistance  Comment: Not applicable, tube feed  Reason if not Attempted: Not applicable  CARE Score: 9  Discharge Goal: Supervision or touching assistance       Oral Hygiene: Oral Hygiene  Assistance Needed: Partial/moderate assistance  Comment: required assist to control the swab in mouth  CARE Score: 3  Discharge Goal: Supervision or touching assistance    UB/LB Bathing: Shower/Bathe Self  Assistance Needed: Substantial/maximal
Spoke with phlebotomist Bernadette Iniguez, she is do draw the H&H that is due.
Tawana Johns    : 1934  Acct #: [de-identified]  MRN: [id number]              PM&R Progress Note      Admitting diagnosis: ***    Comorbid diagnoses impacting rehabilitation: ***    Chief complaint: ***    Prior (baseline) level of function: Independent. Current level of function:         Current  IRF-DAYDAY and Goals:   Occupational Therapy:    Short Term Goals  Time Frame for Short Term Goals: LTGs=STGs :   Long Term Goals  Time Frame for Long Term Goals : 14 days until D/C  Long Term Goal 1: Pt will complete oral hygiene and self-feeding with SBA  Long Term Goal 2: Pt will complete UB dressing + bathing with Min A  Long Term Goal 3: Pt will complete LB dressing and bathing with Min A  Long Term Goal 4: Pt will complete all aspecets of toileting with Min A  Long Term Goal 5: Pt will complete all functional transfers with Min A  Additional Goals?: Yes  Long Term Goal 6: Pt will complete donning/ doffing foot wear using AE PRN with Min A  Long Term Goal 7: Pt will complete a 3 minute standing functional activity to improve strength, endurance and performance with ADLs with Min A :                                       Eating: Eating  Assistance Needed: Partial/moderate assistance  Comment: Not applicable, tube feed  Reason if not Attempted: Not applicable  CARE Score: 9  Discharge Goal: Supervision or touching assistance       Oral Hygiene: Oral Hygiene  Assistance Needed: Partial/moderate assistance  Comment: required assist to control the swab in mouth  CARE Score: 3  Discharge Goal: Supervision or touching assistance    UB/LB Bathing: Shower/Bathe Self  Assistance Needed: Substantial/maximal assistance  Comment: Pt able to wash UB with Max verbal cues and [place] assist at times.  OT attempted to facilitate Pt using LHS for distal BLEs with Pt only completing small area of thighs and therapist completing the rest.  CARE Score: 2  Discharge Goal: Partial/moderate assistance    UB Dressing: Upper Body
This nurse called into patients room by bedside sitter stating it appears tube feed is leaking out of patients nose. Upon inspection the only thing holding the tube in place through the right nare is the tape on patients nose, tube is no longer in place. Tube feed stopped and Dr. Jadyn Vargas aware, states he wants replaced. Charge nurse Brina Raines RN aware and called for a new tube feeding NG to be delivered.
[facility] UNIT  SPEECH/LANGUAGE PATHOLOGY      [x] Daily           [] Discharge    Patient:Nita Garcia      :1934  B:[id number]  Rehab Dx/Hx: Cerebral infarction, unspecified [I63.9]  Acute CVA (cerebrovascular accident) ([address] [I63.9]   Allergies   Allergen Reactions    Motrin [Ibuprofen]     Tramadol      Precautions: ; Restrictions/Precautions: Fall Risk, General Precautions, NPO (NG tube, 24/7 sitter)          Home Situation/IADL:   Social/Functional History  Lives With: Son, Family  Type of Home: House  Home Layout: One level  Home Access: Stairs to enter with rails  Entrance Stairs - Number of Steps: 2  Entrance Stairs - Rails: Left  Bathroom Shower/Tub: Tub/Shower unit  Bathroom Toilet: Standard  Bathroom Equipment: Shower chair  Bathroom Accessibility: Accessible  Home Equipment: Walker, rolling, Cane  Has the patient had two or more falls in the past year or any fall with injury in the past year?: No  ADL Assistance: Independent  Homemaking Assistance: Needs assistance  Ambulation Assistance: Independent  Transfer Assistance: Independent  Active : No  Patient's  Info: Hasn't driven in over a year--eye  Macular degeneration. son drives. Education: very limited education  Occupation: Retired  Type of Occupation: raised 5 kids and worked at [employer]: enjoys walking, cooking, cleaning. No pets. Son manages medications and finances. "[employer] and [employer].  [facility] being outside. IADL Comments: Pt does not read or write. Kids: Nathan Dar, Silas, [facility]  Additional Comments: Per son pt sleeps in a bed or in an easy chair if legs bothering her. Pt frequently in pain will get fidgety if uncomfortable or put ice packs on legs.       Date of Admit: 2023  Room #: 1006/1006-A     ST Number of Minutes/Billable Intervention  Cog/Memory Deficits     Aphasia/Language 34    Dysarthria/Speech
[facility] [place] UNIT  SPEECH/LANGUAGE PATHOLOGY      [x] Daily           [] Discharge    Patient:Nita Hadley      :1934  SSF:[id number]  Rehab Dx/Hx: Cerebral infarction, unspecified [I63.9]  Acute CVA (cerebrovascular accident) ([address] [I63.9]   Allergies   Allergen Reactions    Motrin [Ibuprofen]     Tramadol      Precautions:  Restrictions/Precautions: Fall Risk, General Precautions, NPO (NG tube, 24/7 sitter)          Home Situation/IADL:   Social/Functional History  Lives With: Son, Family  Type of Home: House  Home Layout: One level  Home Access: Stairs to enter with rails  Entrance Stairs - Number of Steps: 2  Entrance Stairs - Rails: Left  Bathroom Shower/Tub: Tub/Shower unit  Bathroom Toilet: Standard  Bathroom Equipment: Shower chair  Bathroom Accessibility: Accessible  Home Equipment: Walker, rolling, [address]  Has the patient had two or more falls in the past year or any fall with injury in the past year?: No  ADL Assistance: Independent  Homemaking Assistance: Needs assistance  Ambulation Assistance: Independent  Transfer Assistance: Independent  Active : No  Patient's  Info: Hasn't driven in over a year--eye  Macular degeneration. son drives. Education: very limited education  Occupation: Retired  Type of Occupation: raised 5 kids and worked at [employer]: enjoys walking, cooking, cleaning. No pets. Son manages medications and finances. [employer].  [facility] being outside. IADL Comments: Pt does not read or write. Kids: Junaid Marrufo Sandy Elwyn Jay  Additional Comments: Per son pt sleeps in a bed or in an easy chair if legs bothering her. Pt frequently in pain will get fidgety if uncomfortable or put ice packs on legs.       Date of Admit: 2023  Room #: 1006/1006-A     ST Number of Minutes/Billable Intervention  Cog/Memory Deficits     Aphasia/Language 30    Dysarthria/Speech
Xray is back and per Dr. Saima Ferro ok to resume tube feeds.  Tube feed resumed
IBW. Weight Source: Bed Scale  Current BMI (kg/m2): 22.6  Usual Body Weight: 135 lb (61.2 kg) (6/11/23, [facility])  % Weight Change (Calculated): -12.1            BMI Categories: Normal Weight (BMI 18.5-24. 9)    Estimated Daily Nutrient Needs:  Energy Requirements Based On: Kcal/kg  Weight Used for Energy Requirements: Current  Energy (kcal/day): 2997-4293 (30-35 kcal/kg)  Weight Used for Protein Requirements: Current  Protein (g/day): 64-75 (1.2-1.4 g/kg)  Method Used for Fluid Requirements: 1 ml/kcal  Fluid (ml/day): 9936-6002    Nutrition Diagnosis:   Severe malnutrition, In context of chronic illness related to early satiety, swallowing difficulty as evidenced by poor intake prior to admission, moderate muscle loss, moderate loss of subcutaneous fat    Nutrition Interventions:   Food and/or Nutrient Delivery: Continue NPO, Continue Current Tube Feeding  Nutrition Education/Counseling: No recommendation at this time  Coordination of Nutrition Care: Continue to monitor while inpatient, Speech Therapy  Plan of Care discussed with: sitter in room    Goals:  Previous Goal Met: Progressing toward Goal(s)  Goals: Tolerate nutrition support at goal rate       Nutrition Monitoring and Evaluation:   Behavioral-Environmental Outcomes: None Identified  Food/Nutrient Intake Outcomes: Enteral Nutrition Intake/Tolerance  Physical Signs/Symptoms Outcomes: Biochemical Data, GI Status, Skin, Weight, Chewing or Swallowing    Discharge Planning:     Too soon to determine     Yoana Birch RD, LD  Contact: [phone]
Substantial/maximal assistance  Comment: Pt required more than 75% assist due to challenge with initiation/ sequencing following verbal cues; assist provided for washing, rinsing, and drying. Pt attempted to wash a small part of her chest  CARE Score: 2  Discharge Goal: Partial/moderate assistance    UB Dressing: Upper Body Dressing  Assistance Needed: Partial/moderate assistance  Comment: Mod assist. heavy cuing and directing UE to don shirt  CARE Score: 3  Discharge Goal: Partial/moderate assistance         LB Dressing: Lower Body Dressing  Assistance Needed: Substantial/maximal assistance  Comment: Max assist to thread LE. Pt managed pants over L hip. CARE Score: 2  Discharge Goal: Partial/moderate assistance    Donning and [place] Footwear: Putting On/Taking Off Footwear  Assistance Needed: Dependent  Comment: Pt attempted to reach for socks. Pt dependent. CARE Score: 1  Discharge Goal: Partial/moderate assistance      Toiletin [address] needed: Dependent  Comment: Pt required total assist to complete clothing managment, britt-care, and wipe posterior area  CARE Score: 1  Discharge Goal: Partial/moderate assistance      Toilet Transfers:   Toilet Transfer  Assistance needed: Substantial/maximal assistance  Comment: Pt required maximal A to transfer onto the standard toilet and required mod A to stand from toilet, use of a Rww and grab bars with maximal vebral cues for sequencing transfer  CARE Score: 2  Discharge Goal: Partial/moderate assistance    Physical Therapy:   Short Term Goals  Time Frame for Short Term Goals: 10-14 days, STG=LTG  Short Term Goal 1: pt to complete all bed mobility mod I  Short Term Goal 2: Pt will perform sit <>stand, chair<>bed and car transfers with supervision  Short Term Goal 3: Pt will ambulate with 2ww 50' on level surface with CGA and 150' on level surface/10'unlevel surface with min assist  Short Term Goal 4: Pt will ascend/descend curb step with 2ww and up
considering hospital bed and transport chair     PT IRF-DAYDAY scores and goals for discharge assessment:     Sit to Lying  Assistance Needed: Partial/moderate assistance  Comment: Mod A with bed features (transfer completed from the L side of bed; required lifting of RLE and some assistance to readjust upper body for proper body alignment due to pt's increased fatigue and increased sleepiness at the end of this PT session); required additional set-up assist of NG tube/IV pole during transfer process for safety  CARE Score: 3  Discharge Goal: Independent      Sit to Stand  Assistance Needed: Partial/moderate assistance  Comment: Min A with 2WW (required manual cues on hand placement with simultaneous direct cue to stand; pt positions hands on AD appropriately 50% of the time, otherwise requires manual cues to correct; required additional set-up assist of NG tube prior to standing for safety)  CARE Score: 3  Discharge Goal: Supervision or touching assistance    Chair/Bed-to-Chair Transfer  Assistance Needed: Partial/moderate assistance  Comment: Min-Mod A with 2WW (pt does not transition hands with VCs requiring manual cues on at least R hand with additional assist on NG tube line management for safety when turning and prior to sitting  CARE Score: 3  Discharge Goal: Supervision or touching assistance      Walk 10 Feet?   Walk 10 Feet?: Yes      Walking Ability  Does the Patient Walk?: Yes    Walk 10 Feet  Assistance Needed: Partial/moderate assistance  Comment: Min A  x 1 using 2WW with additional assist on IV pole/NG tube management; used visual target to complete this mobility task in the room  CARE Score: 3  Discharge Goal: Supervision or touching assistance    Walk 50 Feet with Two Turns  Assistance Needed: Partial/moderate assistance  Physical Assistance Level: 75% or more  Comment: Min A  x 1 using 2WW with additional assist on IV pole/NG tube management; used visual target to complete this mobility task in the
help pt but pt became agitated and pushed therapist's hands away and covered self up with gown. UB Dressing: Upper Body Dressing  Assistance Needed: Partial/moderate assistance  Comment: Mod assist. heavy cuing and directing UE to don shirt  CARE Score: 3  Discharge Goal: Partial/moderate assistance         LB Dressing: Lower Body Dressing  Assistance Needed: Substantial/maximal assistance  Comment: Max assist to thread LE. Pt managed pants over L hip. CARE Score: 2  Discharge Goal: Partial/moderate assistance    Donning and [place] Footwear: Putting On/Taking Off Footwear  Assistance Needed: Dependent  Comment: Pt attempted to reach for socks. Pt dependent. CARE Score: 1  Discharge Goal: Partial/moderate assistance      Bed Mobility:           []   Pt received out of bed   Rolling R/L:  Max   Scooting:  Max  Supine --> Sit:  Max  Sit --> Supine:      Transfers:    Sit--> Stand:  Max a  Stand --> Sit:     Stand-Pivot:   Max a to wc  Other:    Assistive device required for transfer:         Functional Mobility:  around room, bathroom, and aru hallway  Assistance:  Max a vc level  Device:   []   Rolling Walker     []   Standard Walker [x]   Wheelchair        []   [employer]       []   4-Wheeled [place]         []   Cardiac Walker       []   Other:        Additional Therapeutic activities/exercises completed this date:     [x]   ADL Training   [x]   Balance/Postural training- Pt completed reaching activity to increase postural stability, core strength, initiation, problem solving, and balance when reaching outside QUYEN. Pt required max vc for encouragement to participate and reach for items. Pt attempted to place items (peanut butter jar, salt shaker, and playing card in mouth).     [x]   Bed/Transfer Training   [x]   Endurance Training   []   Neuromuscular Re-ed   []   Nu-step:  Time:        Level:         #Steps:       []   Rebounder:    []  Seated     []  Standing        []   Supine Ther Ex (reps/sets):     []   Seated
Pt will complete donning/ doffing foot wear using AE PRN with Min A  Long Term Goal 7: Pt will complete a 3 minute standing functional activity to improve strength, endurance and performance with ADLs with Min A:        Plan of Care                                                                              Times per week: 5 days per week for a minimum of 60 minutes/day plus group as appropriate for 60 minutes. Treatment to include Occupational Therapy Plan  Times Per Day:  Once a day  Current Treatment Recommendations: Strengthening, ROM, Balance training, Functional mobility training, Endurance training, Pain management, Equipment evaluation, education, & procurement, Patient/Caregiver education & training, Safety education & training, Positioning, Self-Care / ADL, Home management training, Coordination training, Cognitive/Perceptual training, Cognitive reorientation, Neuromuscular re-education    Electronically signed by   KIRT Michelle, OTR/L #[id number]  6/22/2023, 2:23 PM
complete all aspecets of toileting with Min A  Long Term Goal 5: Pt will complete all functional transfers with Min A  Additional Goals?: Yes  Long Term Goal 6: Pt will complete donning/ doffing foot wear using AE PRN with Min A  Long Term Goal 7: Pt will complete a 3 minute standing functional activity to improve strength, endurance and performance with ADLs with Min A:        Plan of Care                                                                              Times per week: 5 days per week for a minimum of 60 minutes/day plus group as appropriate for 60 minutes. Treatment to include Occupational Therapy Plan  Times Per Day:  Once a day  Current Treatment Recommendations: Strengthening, ROM, Balance training, Functional mobility training, Endurance training, Pain management, Equipment evaluation, education, & procurement, Patient/Caregiver education & training, Safety education & training, Positioning, Self-Care / ADL, Home management training, Coordination training, Cognitive/Perceptual training, Cognitive reorientation, Neuromuscular re-education    Electronically signed by   Fausto Meigs, OTD, BRENTR/L #[id number]  6/21/2023, 3:47 PM
required alternative feedings because of her dysphagia. An NG tube was placed and then replaced and she has been tolerating tube feeding without obvious complication. However, before the NG tube was placed, she developed signs of aspiration pneumonia and antibiotics have been ongoing. She has now been restarted on anticoagulation understanding that she has had a recent GI bleed. She is on twice a day PPI therapy. She continues to have severe right-sided weakness, impaired language and some agitation and is unsafe to return home. During evaluation: Upon arrival pt was sitting in recliner with CNA. Nursing reported that pt was being impulsive attempting to stand without assistance possibly due to pain. Pt's NG-tube was in-place during evaluation. Pt demonstrated challenge with initiation and sequencing  of self-care tasks and transfers requiring maximal verbal cues and tactile cues to improve pt's ability to participate. Pt was able to respond to yes/ no questions inconsistently and was able to follow one-step directions with repetitions inconsistently. Pt completed transfers with Mod to Max A with maximal verbal cues for hand placement and transfer technique using a RWW. Pt completed bathing with maximal assist and dressing with maximal assist, therapist provided verbal cues and tactile cues during self-care skills to improve performance with limited improvements. Pt required assist with control when completing oral hygiene and had a difficult time grasping the swab stick to clean teeth. Pt required 2- person assist for sit> supine due to no initiation from pt, following verbal cues (step- by step)  and tactile cues. Pt will demonstrated a deficit in ADL performance, strength, endurance, and functional mobility. Pt will benefit from OT services to improve in skills with transfer training, ADL training, and executive functioning training. Pt may benefit from using a long-handled sponge for bathing.
you?\"  [] 0. Never  [] 1. Rarely  [] 2. Sometimes  [] 3. Often  [] 4. Always  [] 8. Patient unable to respond    Pain Effect on Sleep  \"Over the past 5 days, how much of the time has pain made it hard for you to sleep at night? \"  []  0. Does not apply - I have not had any pain or hurting in the past 5 days  []  1. Rarely or not at all  []  2. Occasionally  []  3. Frequently  []  4. Almost constantly  []  8. Unable to answer  **If the patient answers \"0. Does not apply\" to this question, skip the next two \"Pain\" questions**      Pain Interference with Therapy Activities  \"Over the past 5 days, how often have you limited your participation in rehabilitation therapy sessions due to pain? \"  []  0. Does not apply - I have not received rehabilitation therapy in the past 5 days  []  1. Rarely or not at all  []  2. Occasionally  []  3. Frequently  []  4. Almost constantly  []  8. Unable to answer    Pain Interference with Day-to-Day Activities: \"Over the past 5 days, how often have you limited your day-to-day activities (excluding rehabilitation therapy session)? \"  []  1. Rarely or not at all  []  2. Occasionally  []  3. Frequently  []  4. Almost constantly  []  8.   Unable to answer
Times per week: 5 days per week for a minimum of 60 minutes/day plus group as appropriate for 60 minutes.   Treatment to include Current Treatment Recommendations: Strengthening, Balance training, Functional mobility training, Transfer training, IADL training, Cognitive/Perceptual training, Endurance training, Neuromuscular re-education, Stair training, Gait training, Home exercise program, Safety education & training, Patient/Caregiver education & training, Equipment evaluation, education, & procurement, Modalities, Therapeutic activities, Positioning    Electronically signed by   Anika Rodriguez PT  6/23/2023, 3:39 PM
demonstrate  no acute abnormality. BONES/SOFT TISSUES: The bone marrow signal intensity appears normal. The soft  tissues demonstrate no acute abnormality. IMPRESSION:  1. Acute left frontal infarct. No evidence of hemorrhagic transformation. 2. Chronic right frontal encephalomalacia/gliosis. 3. Involutional parenchymal changes with mild microvascular ischemic disease. Primary Complaint regarding speech/language/cognition: PLOF obtained from son, Marimar De Dios. Pt does not read or write PTA with very low education level. Assessment:  Cognitive Diagnosis: Lethargy and aphasia--ongoing assessment needed. PT/OT notes show reduced sequencing and awareness. Aphasia Diagnosis: Severe receptive/expressive aphasia compounded by lethargy this date--ongoing assessment needed. Speech Diagnosis: Pt able to produce occasional single words with cues--does not initiate communication at this time. Marked impairments        Recommendations:  Requires SLP Intervention: Yes  Duration of Treatment: 2x/week x3 weeks 30 mins min--Pt would benefit from PT or OT cotx. D/C Recommendations: Ongoing speech therapy is recommended during this hospitalization; To be determined  Referral To: Dietician    Plan:   Goals:  Short Term Goals  Time Frame for Short Term Goals: 2x/week x3 weeks 30 mins min  LTG: Pt will utilize total communication to get daily basic needs met within environment. (Pt is unable to read or write so pictured stim needs to be utilized when needed). Goal 1: Pt will id objects in FO2 with 50% acc. Goal 2: Pt will follow simple commands with tactile cues to initiate and demonstration with 50% acc. Goal 3: Pt will initiate phonation with cues x5 within session. Goal 4: Pt will respond to y/n questions for self and environment using gestures,nods, facial expressions or verbalizations with 60% acc. Goal 5: Pt will imitate single words with max cues 50%.    Patient/family involved in developing goals and treatment
re-education    Electronically signed by   KIRT Julian, OTR/L #[id number]  6/20/2023, 3:45 PM

## 2023-06-25 VITALS
RESPIRATION RATE: 18 BRPM | HEIGHT: 60 IN | BODY MASS INDEX: 21.86 KG/M2 | TEMPERATURE: 98.6 F | WEIGHT: 111.33 LBS | SYSTOLIC BLOOD PRESSURE: 121 MMHG | DIASTOLIC BLOOD PRESSURE: 73 MMHG | HEART RATE: 102 BPM | OXYGEN SATURATION: 96 %

## 2023-06-25 LAB
ALBUMIN SERPL-MCNC: 2.5 GM/DL (ref 3.4–5)
ALP BLD-CCNC: 194 IU/L (ref 40–128)
ALT SERPL-CCNC: 15 U/L (ref 10–40)
ANION GAP SERPL CALCULATED.3IONS-SCNC: 12 MMOL/L (ref 4–16)
AST SERPL-CCNC: 15 IU/L (ref 15–37)
BASOPHILS ABSOLUTE: 0 K/CU MM
BASOPHILS RELATIVE PERCENT: 0.3 % (ref 0–1)
BILIRUB SERPL-MCNC: 0.3 MG/DL (ref 0–1)
BUN SERPL-MCNC: 27 MG/DL (ref 6–23)
CALCIUM SERPL-MCNC: 7.7 MG/DL (ref 8.3–10.6)
CHLORIDE BLD-SCNC: 93 MMOL/L (ref 99–110)
CO2: 27 MMOL/L (ref 21–32)
CREAT SERPL-MCNC: 0.7 MG/DL (ref 0.6–1.1)
DIFFERENTIAL TYPE: ABNORMAL
EOSINOPHILS ABSOLUTE: 0.1 K/CU MM
EOSINOPHILS RELATIVE PERCENT: 0.8 % (ref 0–3)
GFR SERPL CREATININE-BSD FRML MDRD: >60 ML/MIN/1.73M2
GLUCOSE BLD-MCNC: 196 MG/DL (ref 70–99)
GLUCOSE BLD-MCNC: 253 MG/DL (ref 70–99)
GLUCOSE BLD-MCNC: 264 MG/DL (ref 70–99)
GLUCOSE BLD-MCNC: 284 MG/DL (ref 70–99)
GLUCOSE BLD-MCNC: 303 MG/DL (ref 70–99)
GLUCOSE SERPL-MCNC: 233 MG/DL (ref 70–99)
HCT VFR BLD CALC: 29.4 % (ref 37–47)
HEMOGLOBIN: 8.4 GM/DL (ref 12.5–16)
IMMATURE NEUTROPHIL %: 0.5 % (ref 0–0.43)
LYMPHOCYTES ABSOLUTE: 0.8 K/CU MM
LYMPHOCYTES RELATIVE PERCENT: 6.3 % (ref 24–44)
MCH RBC QN AUTO: 22.9 PG (ref 27–31)
MCHC RBC AUTO-ENTMCNC: 28.6 % (ref 32–36)
MCV RBC AUTO: 80.1 FL (ref 78–100)
MONOCYTES ABSOLUTE: 1 K/CU MM
MONOCYTES RELATIVE PERCENT: 7.9 % (ref 0–4)
NUCLEATED RBC %: 0 %
PDW BLD-RTO: 16.2 % (ref 11.7–14.9)
PLATELET # BLD: 408 K/CU MM (ref 140–440)
PMV BLD AUTO: 10.8 FL (ref 7.5–11.1)
POTASSIUM SERPL-SCNC: 4.4 MMOL/L (ref 3.5–5.1)
RBC # BLD: 3.67 M/CU MM (ref 4.2–5.4)
SEGMENTED NEUTROPHILS ABSOLUTE COUNT: 10.9 K/CU MM
SEGMENTED NEUTROPHILS RELATIVE PERCENT: 84.2 % (ref 36–66)
SODIUM BLD-SCNC: 132 MMOL/L (ref 135–145)
TOTAL IMMATURE NEUTOROPHIL: 0.06 K/CU MM
TOTAL NUCLEATED RBC: 0 K/CU MM
TOTAL PROTEIN: 4.5 GM/DL (ref 6.4–8.2)
WBC # BLD: 13 K/CU MM (ref 4–10.5)

## 2023-06-25 PROCEDURE — 85025 COMPLETE CBC W/AUTO DIFF WBC: CPT

## 2023-06-25 PROCEDURE — C9113 INJ PANTOPRAZOLE SODIUM, VIA: HCPCS | Performed by: INTERNAL MEDICINE

## 2023-06-25 PROCEDURE — 94640 AIRWAY INHALATION TREATMENT: CPT

## 2023-06-25 PROCEDURE — 6360000002 HC RX W HCPCS: Performed by: INTERNAL MEDICINE

## 2023-06-25 PROCEDURE — 80053 COMPREHEN METABOLIC PANEL: CPT

## 2023-06-25 PROCEDURE — 1280000000 HC REHAB R&B

## 2023-06-25 PROCEDURE — 6370000000 HC RX 637 (ALT 250 FOR IP): Performed by: INTERNAL MEDICINE

## 2023-06-25 PROCEDURE — 94664 DEMO&/EVAL PT USE INHALER: CPT

## 2023-06-25 PROCEDURE — 94761 N-INVAS EAR/PLS OXIMETRY MLT: CPT

## 2023-06-25 PROCEDURE — 82962 GLUCOSE BLOOD TEST: CPT

## 2023-06-25 PROCEDURE — 36415 COLL VENOUS BLD VENIPUNCTURE: CPT

## 2023-06-25 PROCEDURE — 2580000003 HC RX 258: Performed by: PHYSICAL MEDICINE & REHABILITATION

## 2023-06-25 PROCEDURE — 6360000002 HC RX W HCPCS: Performed by: PHYSICAL MEDICINE & REHABILITATION

## 2023-06-25 PROCEDURE — 6370000000 HC RX 637 (ALT 250 FOR IP): Performed by: PHYSICAL MEDICINE & REHABILITATION

## 2023-06-25 PROCEDURE — 2500000003 HC RX 250 WO HCPCS: Performed by: INTERNAL MEDICINE

## 2023-06-25 RX ADMIN — GUAIFENESIN 200 MG: 100 SOLUTION ORAL at 18:11

## 2023-06-25 RX ADMIN — INSULIN LISPRO 2 UNITS: 100 INJECTION, SOLUTION INTRAVENOUS; SUBCUTANEOUS at 18:11

## 2023-06-25 RX ADMIN — ONDANSETRON 4 MG: 2 INJECTION INTRAMUSCULAR; INTRAVENOUS at 15:27

## 2023-06-25 RX ADMIN — HYDROMORPHONE HYDROCHLORIDE 0.25 MG: 1 INJECTION, SOLUTION INTRAMUSCULAR; INTRAVENOUS; SUBCUTANEOUS at 05:25

## 2023-06-25 RX ADMIN — INSULIN LISPRO 3 UNITS: 100 INJECTION, SOLUTION INTRAVENOUS; SUBCUTANEOUS at 00:33

## 2023-06-25 RX ADMIN — IPRATROPIUM BROMIDE AND ALBUTEROL SULFATE 1 DOSE: 2.5; .5 SOLUTION RESPIRATORY (INHALATION) at 11:00

## 2023-06-25 RX ADMIN — CEFEPIME 2000 MG: 2 INJECTION, POWDER, FOR SOLUTION INTRAVENOUS at 18:14

## 2023-06-25 RX ADMIN — HYDROCORTISONE ACETATE 25 MG: 25 SUPPOSITORY RECTAL at 09:01

## 2023-06-25 RX ADMIN — LORAZEPAM 0.25 MG: 0.5 TABLET ORAL at 09:01

## 2023-06-25 RX ADMIN — INSULIN LISPRO 2 UNITS: 100 INJECTION, SOLUTION INTRAVENOUS; SUBCUTANEOUS at 12:16

## 2023-06-25 RX ADMIN — FUROSEMIDE 40 MG: 40 TABLET ORAL at 09:01

## 2023-06-25 RX ADMIN — GUAIFENESIN 200 MG: 100 SOLUTION ORAL at 12:17

## 2023-06-25 RX ADMIN — PANTOPRAZOLE SODIUM 40 MG: 40 INJECTION, POWDER, FOR SOLUTION INTRAVENOUS at 09:01

## 2023-06-25 RX ADMIN — INSULIN LISPRO 2 UNITS: 100 INJECTION, SOLUTION INTRAVENOUS; SUBCUTANEOUS at 06:00

## 2023-06-25 RX ADMIN — HYDROCORTISONE ACETATE 25 MG: 25 SUPPOSITORY RECTAL at 20:29

## 2023-06-25 RX ADMIN — AMLODIPINE BESYLATE 5 MG: 5 TABLET ORAL at 09:01

## 2023-06-25 RX ADMIN — HYDROMORPHONE HYDROCHLORIDE 0.25 MG: 1 INJECTION, SOLUTION INTRAMUSCULAR; INTRAVENOUS; SUBCUTANEOUS at 21:38

## 2023-06-25 RX ADMIN — GUAIFENESIN 200 MG: 100 SOLUTION ORAL at 20:29

## 2023-06-25 RX ADMIN — HYDROMORPHONE HYDROCHLORIDE 0.25 MG: 1 INJECTION, SOLUTION INTRAMUSCULAR; INTRAVENOUS; SUBCUTANEOUS at 18:11

## 2023-06-25 RX ADMIN — PANTOPRAZOLE SODIUM 40 MG: 40 INJECTION, POWDER, FOR SOLUTION INTRAVENOUS at 20:29

## 2023-06-25 RX ADMIN — CEFEPIME 2000 MG: 2 INJECTION, POWDER, FOR SOLUTION INTRAVENOUS at 06:59

## 2023-06-25 RX ADMIN — GUAIFENESIN 200 MG: 100 SOLUTION ORAL at 09:02

## 2023-06-25 RX ADMIN — HYDROMORPHONE HYDROCHLORIDE 0.25 MG: 1 INJECTION, SOLUTION INTRAMUSCULAR; INTRAVENOUS; SUBCUTANEOUS at 13:37

## 2023-06-25 RX ADMIN — IPRATROPIUM BROMIDE AND ALBUTEROL SULFATE 1 DOSE: 2.5; .5 SOLUTION RESPIRATORY (INHALATION) at 07:20

## 2023-06-25 RX ADMIN — ATORVASTATIN CALCIUM 40 MG: 40 TABLET, FILM COATED ORAL at 20:29

## 2023-06-25 RX ADMIN — LORAZEPAM 0.25 MG: 0.5 TABLET ORAL at 20:21

## 2023-06-25 ASSESSMENT — PAIN - FUNCTIONAL ASSESSMENT: PAIN_FUNCTIONAL_ASSESSMENT: PREVENTS OR INTERFERES SOME ACTIVE ACTIVITIES AND ADLS

## 2023-06-25 ASSESSMENT — PAIN DESCRIPTION - LOCATION
LOCATION: GENERALIZED
LOCATION: GENERALIZED

## 2023-06-25 ASSESSMENT — PAIN DESCRIPTION - FREQUENCY: FREQUENCY: INTERMITTENT

## 2023-06-25 ASSESSMENT — PAIN SCALES - WONG BAKER
WONGBAKER_NUMERICALRESPONSE: 0

## 2023-06-25 ASSESSMENT — PAIN DESCRIPTION - PAIN TYPE: TYPE: ACUTE PAIN

## 2023-06-25 ASSESSMENT — PAIN SCALES - GENERAL
PAINLEVEL_OUTOF10: 10
PAINLEVEL_OUTOF10: 10
PAINLEVEL_OUTOF10: 0

## 2023-06-25 ASSESSMENT — PAIN DESCRIPTION - ONSET: ONSET: ON-GOING

## 2023-06-25 ASSESSMENT — PAIN DESCRIPTION - ORIENTATION
ORIENTATION: OTHER (COMMENT)
ORIENTATION: RIGHT

## 2023-06-25 ASSESSMENT — PAIN DESCRIPTION - DESCRIPTORS
DESCRIPTORS: NAGGING
DESCRIPTORS: ACHING

## 2023-06-26 ENCOUNTER — APPOINTMENT (OUTPATIENT)
Dept: GENERAL RADIOLOGY | Age: 88
DRG: 056 | End: 2023-06-26
Attending: PHYSICAL MEDICINE & REHABILITATION
Payer: MEDICARE

## 2023-06-26 LAB
ANION GAP SERPL CALCULATED.3IONS-SCNC: 8 MMOL/L (ref 4–16)
BUN SERPL-MCNC: 29 MG/DL (ref 6–23)
CALCIUM SERPL-MCNC: 7.9 MG/DL (ref 8.3–10.6)
CHLORIDE BLD-SCNC: 93 MMOL/L (ref 99–110)
CO2: 29 MMOL/L (ref 21–32)
CREAT SERPL-MCNC: 0.7 MG/DL (ref 0.6–1.1)
GFR SERPL CREATININE-BSD FRML MDRD: >60 ML/MIN/1.73M2
GLUCOSE BLD-MCNC: 231 MG/DL (ref 70–99)
GLUCOSE BLD-MCNC: 264 MG/DL (ref 70–99)
GLUCOSE BLD-MCNC: 268 MG/DL (ref 70–99)
GLUCOSE BLD-MCNC: 271 MG/DL (ref 70–99)
GLUCOSE BLD-MCNC: 274 MG/DL (ref 70–99)
GLUCOSE SERPL-MCNC: 267 MG/DL (ref 70–99)
POTASSIUM SERPL-SCNC: 4.5 MMOL/L (ref 3.5–5.1)
PRO-BNP: 1818 PG/ML
SODIUM BLD-SCNC: 130 MMOL/L (ref 135–145)

## 2023-06-26 PROCEDURE — 97530 THERAPEUTIC ACTIVITIES: CPT

## 2023-06-26 PROCEDURE — C9113 INJ PANTOPRAZOLE SODIUM, VIA: HCPCS | Performed by: INTERNAL MEDICINE

## 2023-06-26 PROCEDURE — 82962 GLUCOSE BLOOD TEST: CPT

## 2023-06-26 PROCEDURE — 6360000002 HC RX W HCPCS: Performed by: INTERNAL MEDICINE

## 2023-06-26 PROCEDURE — 6360000002 HC RX W HCPCS: Performed by: PHYSICAL MEDICINE & REHABILITATION

## 2023-06-26 PROCEDURE — 2500000003 HC RX 250 WO HCPCS: Performed by: INTERNAL MEDICINE

## 2023-06-26 PROCEDURE — 94640 AIRWAY INHALATION TREATMENT: CPT

## 2023-06-26 PROCEDURE — 1280000000 HC REHAB R&B

## 2023-06-26 PROCEDURE — 80048 BASIC METABOLIC PNL TOTAL CA: CPT

## 2023-06-26 PROCEDURE — 99233 SBSQ HOSP IP/OBS HIGH 50: CPT | Performed by: PHYSICAL MEDICINE & REHABILITATION

## 2023-06-26 PROCEDURE — 97535 SELF CARE MNGMENT TRAINING: CPT

## 2023-06-26 PROCEDURE — 2580000003 HC RX 258: Performed by: PHYSICAL MEDICINE & REHABILITATION

## 2023-06-26 PROCEDURE — 6370000000 HC RX 637 (ALT 250 FOR IP): Performed by: PHYSICAL MEDICINE & REHABILITATION

## 2023-06-26 PROCEDURE — 83880 ASSAY OF NATRIURETIC PEPTIDE: CPT

## 2023-06-26 PROCEDURE — 6370000000 HC RX 637 (ALT 250 FOR IP): Performed by: INTERNAL MEDICINE

## 2023-06-26 PROCEDURE — 94761 N-INVAS EAR/PLS OXIMETRY MLT: CPT

## 2023-06-26 PROCEDURE — 36415 COLL VENOUS BLD VENIPUNCTURE: CPT

## 2023-06-26 PROCEDURE — 2580000003 HC RX 258

## 2023-06-26 RX ORDER — SODIUM CHLORIDE 9 MG/ML
INJECTION INTRAVENOUS
Status: COMPLETED
Start: 2023-06-26 | End: 2023-06-26

## 2023-06-26 RX ORDER — ASPIRIN 81 MG/1
81 TABLET, CHEWABLE ORAL DAILY
Status: DISCONTINUED | OUTPATIENT
Start: 2023-06-26 | End: 2023-06-29 | Stop reason: HOSPADM

## 2023-06-26 RX ADMIN — FUROSEMIDE 40 MG: 40 TABLET ORAL at 09:14

## 2023-06-26 RX ADMIN — HYDROMORPHONE HYDROCHLORIDE 0.25 MG: 1 INJECTION, SOLUTION INTRAMUSCULAR; INTRAVENOUS; SUBCUTANEOUS at 13:07

## 2023-06-26 RX ADMIN — GUAIFENESIN 200 MG: 100 SOLUTION ORAL at 13:09

## 2023-06-26 RX ADMIN — SODIUM CHLORIDE, PRESERVATIVE FREE 10 ML: 5 INJECTION INTRAVENOUS at 09:15

## 2023-06-26 RX ADMIN — IPRATROPIUM BROMIDE AND ALBUTEROL SULFATE 1 DOSE: 2.5; .5 SOLUTION RESPIRATORY (INHALATION) at 07:34

## 2023-06-26 RX ADMIN — APIXABAN 2.5 MG: 2.5 TABLET, FILM COATED ORAL at 22:07

## 2023-06-26 RX ADMIN — AMLODIPINE BESYLATE 5 MG: 5 TABLET ORAL at 09:14

## 2023-06-26 RX ADMIN — PANTOPRAZOLE SODIUM 40 MG: 40 INJECTION, POWDER, FOR SOLUTION INTRAVENOUS at 09:14

## 2023-06-26 RX ADMIN — INSULIN LISPRO 2 UNITS: 100 INJECTION, SOLUTION INTRAVENOUS; SUBCUTANEOUS at 02:05

## 2023-06-26 RX ADMIN — LORAZEPAM 0.25 MG: 0.5 TABLET ORAL at 09:14

## 2023-06-26 RX ADMIN — HYDROMORPHONE HYDROCHLORIDE 0.25 MG: 1 INJECTION, SOLUTION INTRAMUSCULAR; INTRAVENOUS; SUBCUTANEOUS at 17:28

## 2023-06-26 RX ADMIN — HYDROMORPHONE HYDROCHLORIDE 0.25 MG: 1 INJECTION, SOLUTION INTRAMUSCULAR; INTRAVENOUS; SUBCUTANEOUS at 22:08

## 2023-06-26 RX ADMIN — ATORVASTATIN CALCIUM 40 MG: 40 TABLET, FILM COATED ORAL at 22:07

## 2023-06-26 RX ADMIN — APIXABAN 2.5 MG: 2.5 TABLET, FILM COATED ORAL at 13:08

## 2023-06-26 RX ADMIN — ONDANSETRON 4 MG: 2 INJECTION INTRAMUSCULAR; INTRAVENOUS at 10:33

## 2023-06-26 RX ADMIN — LORAZEPAM 0.25 MG: 0.5 TABLET ORAL at 22:07

## 2023-06-26 RX ADMIN — CEFEPIME 2000 MG: 2 INJECTION, POWDER, FOR SOLUTION INTRAVENOUS at 17:34

## 2023-06-26 RX ADMIN — GUAIFENESIN 200 MG: 100 SOLUTION ORAL at 17:29

## 2023-06-26 RX ADMIN — HYDROMORPHONE HYDROCHLORIDE 0.25 MG: 1 INJECTION, SOLUTION INTRAMUSCULAR; INTRAVENOUS; SUBCUTANEOUS at 02:22

## 2023-06-26 RX ADMIN — ASPIRIN 81 MG 81 MG: 81 TABLET ORAL at 13:09

## 2023-06-26 RX ADMIN — PANTOPRAZOLE SODIUM 40 MG: 40 INJECTION, POWDER, FOR SOLUTION INTRAVENOUS at 22:08

## 2023-06-26 RX ADMIN — HYDROCORTISONE ACETATE 25 MG: 25 SUPPOSITORY RECTAL at 23:34

## 2023-06-26 RX ADMIN — INSULIN LISPRO 2 UNITS: 100 INJECTION, SOLUTION INTRAVENOUS; SUBCUTANEOUS at 06:46

## 2023-06-26 RX ADMIN — GUAIFENESIN 200 MG: 100 SOLUTION ORAL at 22:07

## 2023-06-26 RX ADMIN — CEFEPIME 2000 MG: 2 INJECTION, POWDER, FOR SOLUTION INTRAVENOUS at 06:45

## 2023-06-26 RX ADMIN — INSULIN LISPRO 2 UNITS: 100 INJECTION, SOLUTION INTRAVENOUS; SUBCUTANEOUS at 18:36

## 2023-06-26 RX ADMIN — INSULIN LISPRO 2 UNITS: 100 INJECTION, SOLUTION INTRAVENOUS; SUBCUTANEOUS at 13:08

## 2023-06-26 RX ADMIN — ACETAMINOPHEN 650 MG: 325 TABLET ORAL at 09:14

## 2023-06-26 RX ADMIN — HYDROMORPHONE HYDROCHLORIDE 0.25 MG: 1 INJECTION, SOLUTION INTRAMUSCULAR; INTRAVENOUS; SUBCUTANEOUS at 06:41

## 2023-06-26 RX ADMIN — GUAIFENESIN 200 MG: 100 SOLUTION ORAL at 09:13

## 2023-06-26 RX ADMIN — HYDROCORTISONE ACETATE 25 MG: 25 SUPPOSITORY RECTAL at 09:14

## 2023-06-26 ASSESSMENT — PAIN DESCRIPTION - DESCRIPTORS
DESCRIPTORS: ACHING

## 2023-06-26 ASSESSMENT — PAIN SCALES - GENERAL
PAINLEVEL_OUTOF10: 10
PAINLEVEL_OUTOF10: 0
PAINLEVEL_OUTOF10: 6
PAINLEVEL_OUTOF10: 6
PAINLEVEL_OUTOF10: 5
PAINLEVEL_OUTOF10: 0
PAINLEVEL_OUTOF10: 6
PAINLEVEL_OUTOF10: 5
PAINLEVEL_OUTOF10: 5

## 2023-06-26 ASSESSMENT — PAIN DESCRIPTION - LOCATION
LOCATION: ABDOMEN
LOCATION: ABDOMEN
LOCATION: GENERALIZED
LOCATION: ABDOMEN

## 2023-06-26 ASSESSMENT — PAIN SCALES - PAIN ASSESSMENT IN ADVANCED DEMENTIA (PAINAD)
TOTALSCORE: 1
BODYLANGUAGE: 0
BODYLANGUAGE: 1
BREATHING: 0
CONSOLABILITY: 0
NEGVOCALIZATION: 1
TOTALSCORE: 3
CONSOLABILITY: 0
BREATHING: 0
FACIALEXPRESSION: 1
NEGVOCALIZATION: 1
FACIALEXPRESSION: 0

## 2023-06-26 ASSESSMENT — PAIN SCALES - WONG BAKER
WONGBAKER_NUMERICALRESPONSE: 0
WONGBAKER_NUMERICALRESPONSE: 0
WONGBAKER_NUMERICALRESPONSE: 6
WONGBAKER_NUMERICALRESPONSE: 0
WONGBAKER_NUMERICALRESPONSE: 4
WONGBAKER_NUMERICALRESPONSE: 6
WONGBAKER_NUMERICALRESPONSE: 0

## 2023-06-26 ASSESSMENT — PAIN DESCRIPTION - ORIENTATION
ORIENTATION: MID
ORIENTATION: MID
ORIENTATION: OTHER (COMMENT)

## 2023-06-27 LAB
GLUCOSE BLD-MCNC: 171 MG/DL (ref 70–99)
GLUCOSE BLD-MCNC: 177 MG/DL (ref 70–99)
GLUCOSE BLD-MCNC: 204 MG/DL (ref 70–99)
GLUCOSE BLD-MCNC: 206 MG/DL (ref 70–99)
GLUCOSE BLD-MCNC: 238 MG/DL (ref 70–99)
GLUCOSE BLD-MCNC: 241 MG/DL (ref 70–99)

## 2023-06-27 PROCEDURE — 99233 SBSQ HOSP IP/OBS HIGH 50: CPT | Performed by: PHYSICAL MEDICINE & REHABILITATION

## 2023-06-27 PROCEDURE — 92526 ORAL FUNCTION THERAPY: CPT

## 2023-06-27 PROCEDURE — 97530 THERAPEUTIC ACTIVITIES: CPT

## 2023-06-27 PROCEDURE — 6360000002 HC RX W HCPCS: Performed by: PHYSICAL MEDICINE & REHABILITATION

## 2023-06-27 PROCEDURE — 6360000002 HC RX W HCPCS: Performed by: INTERNAL MEDICINE

## 2023-06-27 PROCEDURE — 1280000000 HC REHAB R&B

## 2023-06-27 PROCEDURE — 92507 TX SP LANG VOICE COMM INDIV: CPT

## 2023-06-27 PROCEDURE — 82962 GLUCOSE BLOOD TEST: CPT

## 2023-06-27 PROCEDURE — 97110 THERAPEUTIC EXERCISES: CPT

## 2023-06-27 PROCEDURE — 2580000003 HC RX 258

## 2023-06-27 PROCEDURE — 94761 N-INVAS EAR/PLS OXIMETRY MLT: CPT

## 2023-06-27 PROCEDURE — 2580000003 HC RX 258: Performed by: PHYSICAL MEDICINE & REHABILITATION

## 2023-06-27 PROCEDURE — A4216 STERILE WATER/SALINE, 10 ML: HCPCS

## 2023-06-27 PROCEDURE — C9113 INJ PANTOPRAZOLE SODIUM, VIA: HCPCS | Performed by: INTERNAL MEDICINE

## 2023-06-27 PROCEDURE — 97535 SELF CARE MNGMENT TRAINING: CPT

## 2023-06-27 PROCEDURE — 6370000000 HC RX 637 (ALT 250 FOR IP): Performed by: PHYSICAL MEDICINE & REHABILITATION

## 2023-06-27 PROCEDURE — 97116 GAIT TRAINING THERAPY: CPT

## 2023-06-27 RX ORDER — SODIUM CHLORIDE 9 MG/ML
INJECTION, SOLUTION INTRAVENOUS CONTINUOUS
Status: DISPENSED | OUTPATIENT
Start: 2023-06-27 | End: 2023-06-28

## 2023-06-27 RX ORDER — SODIUM CHLORIDE 9 MG/ML
INJECTION INTRAVENOUS
Status: COMPLETED
Start: 2023-06-27 | End: 2023-06-27

## 2023-06-27 RX ORDER — 0.9 % SODIUM CHLORIDE 0.9 %
500 INTRAVENOUS SOLUTION INTRAVENOUS ONCE
Status: COMPLETED | OUTPATIENT
Start: 2023-06-27 | End: 2023-06-27

## 2023-06-27 RX ADMIN — SODIUM CHLORIDE: 9 INJECTION, SOLUTION INTRAVENOUS at 21:44

## 2023-06-27 RX ADMIN — SODIUM CHLORIDE 10 ML: 9 INJECTION, SOLUTION INTRAMUSCULAR; INTRAVENOUS; SUBCUTANEOUS at 10:44

## 2023-06-27 RX ADMIN — HYDROMORPHONE HYDROCHLORIDE 0.25 MG: 1 INJECTION, SOLUTION INTRAMUSCULAR; INTRAVENOUS; SUBCUTANEOUS at 02:36

## 2023-06-27 RX ADMIN — HYDROCORTISONE ACETATE 25 MG: 25 SUPPOSITORY RECTAL at 21:28

## 2023-06-27 RX ADMIN — HYDROMORPHONE HYDROCHLORIDE 0.25 MG: 1 INJECTION, SOLUTION INTRAMUSCULAR; INTRAVENOUS; SUBCUTANEOUS at 06:40

## 2023-06-27 RX ADMIN — CEFEPIME 2000 MG: 2 INJECTION, POWDER, FOR SOLUTION INTRAVENOUS at 06:41

## 2023-06-27 RX ADMIN — PANTOPRAZOLE SODIUM 40 MG: 40 INJECTION, POWDER, FOR SOLUTION INTRAVENOUS at 10:41

## 2023-06-27 RX ADMIN — HYDROMORPHONE HYDROCHLORIDE 0.25 MG: 1 INJECTION, SOLUTION INTRAMUSCULAR; INTRAVENOUS; SUBCUTANEOUS at 15:25

## 2023-06-27 RX ADMIN — HYDROMORPHONE HYDROCHLORIDE 0.25 MG: 1 INJECTION, SOLUTION INTRAMUSCULAR; INTRAVENOUS; SUBCUTANEOUS at 21:48

## 2023-06-27 RX ADMIN — INSULIN LISPRO 1 UNITS: 100 INJECTION, SOLUTION INTRAVENOUS; SUBCUTANEOUS at 12:57

## 2023-06-27 RX ADMIN — CEFEPIME 2000 MG: 2 INJECTION, POWDER, FOR SOLUTION INTRAVENOUS at 21:45

## 2023-06-27 RX ADMIN — HYDROMORPHONE HYDROCHLORIDE 0.25 MG: 1 INJECTION, SOLUTION INTRAMUSCULAR; INTRAVENOUS; SUBCUTANEOUS at 10:31

## 2023-06-27 RX ADMIN — SODIUM CHLORIDE 500 ML: 9 INJECTION, SOLUTION INTRAVENOUS at 00:32

## 2023-06-27 RX ADMIN — PANTOPRAZOLE SODIUM 40 MG: 40 INJECTION, POWDER, FOR SOLUTION INTRAVENOUS at 21:29

## 2023-06-27 RX ADMIN — SODIUM CHLORIDE: 9 INJECTION, SOLUTION INTRAVENOUS at 15:38

## 2023-06-27 ASSESSMENT — PAIN SCALES - PAIN ASSESSMENT IN ADVANCED DEMENTIA (PAINAD)
BODYLANGUAGE: 1
CONSOLABILITY: 1
BODYLANGUAGE: 1
FACIALEXPRESSION: 1
BODYLANGUAGE: 0
FACIALEXPRESSION: 1
TOTALSCORE: 5
FACIALEXPRESSION: 0
TOTALSCORE: 0
BODYLANGUAGE: 1
FACIALEXPRESSION: 1
CONSOLABILITY: 0
BODYLANGUAGE: 1
BODYLANGUAGE: 1
TOTALSCORE: 5
CONSOLABILITY: 1
NEGVOCALIZATION: 2
TOTALSCORE: 5
NEGVOCALIZATION: 2
NEGVOCALIZATION: 2
FACIALEXPRESSION: 1
CONSOLABILITY: 1
CONSOLABILITY: 0
NEGVOCALIZATION: 2
NEGVOCALIZATION: 2
BREATHING: 0
TOTALSCORE: 5
TOTALSCORE: 2
NEGVOCALIZATION: 2
TOTALSCORE: 5
BREATHING: 0
BREATHING: 0
BODYLANGUAGE: 1
TOTALSCORE: 5
BREATHING: 0
BREATHING: 0
FACIALEXPRESSION: 1
FACIALEXPRESSION: 1
NEGVOCALIZATION: 1
BREATHING: 0
CONSOLABILITY: 1
NEGVOCALIZATION: 0
FACIALEXPRESSION: 1
BREATHING: 0
CONSOLABILITY: 1
BREATHING: 0
BODYLANGUAGE: 0
CONSOLABILITY: 1

## 2023-06-27 ASSESSMENT — PAIN DESCRIPTION - ORIENTATION
ORIENTATION: MID
ORIENTATION: MID

## 2023-06-27 ASSESSMENT — PAIN DESCRIPTION - LOCATION
LOCATION: ABDOMEN

## 2023-06-27 ASSESSMENT — PAIN DESCRIPTION - DESCRIPTORS
DESCRIPTORS: ACHING
DESCRIPTORS: ACHING

## 2023-06-27 ASSESSMENT — PAIN SCALES - WONG BAKER
WONGBAKER_NUMERICALRESPONSE: 6
WONGBAKER_NUMERICALRESPONSE: 4

## 2023-06-27 ASSESSMENT — PAIN SCALES - GENERAL
PAINLEVEL_OUTOF10: 4
PAINLEVEL_OUTOF10: 5

## 2023-06-28 ENCOUNTER — ANESTHESIA EVENT (OUTPATIENT)
Dept: ENDOSCOPY | Age: 88
DRG: 056 | End: 2023-06-28
Payer: MEDICARE

## 2023-06-28 LAB
GLUCOSE BLD-MCNC: 134 MG/DL (ref 70–99)
GLUCOSE BLD-MCNC: 144 MG/DL (ref 70–99)
GLUCOSE BLD-MCNC: 158 MG/DL (ref 70–99)
GLUCOSE BLD-MCNC: 165 MG/DL (ref 70–99)
GLUCOSE BLD-MCNC: 172 MG/DL (ref 70–99)
GLUCOSE BLD-MCNC: 177 MG/DL (ref 70–99)

## 2023-06-28 PROCEDURE — 97530 THERAPEUTIC ACTIVITIES: CPT

## 2023-06-28 PROCEDURE — 6360000002 HC RX W HCPCS: Performed by: PHYSICAL MEDICINE & REHABILITATION

## 2023-06-28 PROCEDURE — 97110 THERAPEUTIC EXERCISES: CPT

## 2023-06-28 PROCEDURE — 94761 N-INVAS EAR/PLS OXIMETRY MLT: CPT

## 2023-06-28 PROCEDURE — 6360000002 HC RX W HCPCS: Performed by: INTERNAL MEDICINE

## 2023-06-28 PROCEDURE — 99233 SBSQ HOSP IP/OBS HIGH 50: CPT | Performed by: PHYSICAL MEDICINE & REHABILITATION

## 2023-06-28 PROCEDURE — C9113 INJ PANTOPRAZOLE SODIUM, VIA: HCPCS | Performed by: INTERNAL MEDICINE

## 2023-06-28 PROCEDURE — 97535 SELF CARE MNGMENT TRAINING: CPT

## 2023-06-28 PROCEDURE — 1280000000 HC REHAB R&B

## 2023-06-28 PROCEDURE — 82962 GLUCOSE BLOOD TEST: CPT

## 2023-06-28 PROCEDURE — 97116 GAIT TRAINING THERAPY: CPT

## 2023-06-28 PROCEDURE — 2580000003 HC RX 258: Performed by: PHYSICAL MEDICINE & REHABILITATION

## 2023-06-28 RX ORDER — LORAZEPAM 2 MG/ML
0.5 INJECTION INTRAMUSCULAR EVERY 6 HOURS PRN
Status: DISCONTINUED | OUTPATIENT
Start: 2023-06-28 | End: 2023-06-29 | Stop reason: HOSPADM

## 2023-06-28 RX ORDER — 0.9 % SODIUM CHLORIDE 0.9 %
500 INTRAVENOUS SOLUTION INTRAVENOUS ONCE
Status: COMPLETED | OUTPATIENT
Start: 2023-06-28 | End: 2023-06-28

## 2023-06-28 RX ORDER — FUROSEMIDE 10 MG/ML
20 INJECTION INTRAMUSCULAR; INTRAVENOUS ONCE
Status: COMPLETED | OUTPATIENT
Start: 2023-06-28 | End: 2023-06-28

## 2023-06-28 RX ADMIN — SODIUM CHLORIDE: 9 INJECTION, SOLUTION INTRAVENOUS at 13:15

## 2023-06-28 RX ADMIN — SODIUM CHLORIDE 500 ML: 9 INJECTION, SOLUTION INTRAVENOUS at 06:14

## 2023-06-28 RX ADMIN — LORAZEPAM 0.5 MG: 2 INJECTION INTRAMUSCULAR; INTRAVENOUS at 16:31

## 2023-06-28 RX ADMIN — HYDROMORPHONE HYDROCHLORIDE 0.25 MG: 1 INJECTION, SOLUTION INTRAMUSCULAR; INTRAVENOUS; SUBCUTANEOUS at 17:38

## 2023-06-28 RX ADMIN — CEFEPIME 2000 MG: 2 INJECTION, POWDER, FOR SOLUTION INTRAVENOUS at 08:08

## 2023-06-28 RX ADMIN — PANTOPRAZOLE SODIUM 40 MG: 40 INJECTION, POWDER, FOR SOLUTION INTRAVENOUS at 19:41

## 2023-06-28 RX ADMIN — CEFEPIME 2000 MG: 2 INJECTION, POWDER, FOR SOLUTION INTRAVENOUS at 19:42

## 2023-06-28 RX ADMIN — HYDROMORPHONE HYDROCHLORIDE 0.25 MG: 1 INJECTION, SOLUTION INTRAMUSCULAR; INTRAVENOUS; SUBCUTANEOUS at 21:50

## 2023-06-28 RX ADMIN — PANTOPRAZOLE SODIUM 40 MG: 40 INJECTION, POWDER, FOR SOLUTION INTRAVENOUS at 10:26

## 2023-06-28 RX ADMIN — FUROSEMIDE 20 MG: 10 INJECTION, SOLUTION INTRAMUSCULAR; INTRAVENOUS at 06:12

## 2023-06-28 RX ADMIN — LORAZEPAM 0.5 MG: 2 INJECTION INTRAMUSCULAR; INTRAVENOUS at 06:13

## 2023-06-28 ASSESSMENT — PAIN DESCRIPTION - DESCRIPTORS: DESCRIPTORS: ACHING

## 2023-06-28 ASSESSMENT — PAIN DESCRIPTION - LOCATION
LOCATION: BACK
LOCATION: ABDOMEN

## 2023-06-28 ASSESSMENT — PAIN SCALES - PAIN ASSESSMENT IN ADVANCED DEMENTIA (PAINAD)
CONSOLABILITY: 1
TOTALSCORE: 2
NEGVOCALIZATION: 0
TOTALSCORE: 5
BREATHING: 0
CONSOLABILITY: 1
BREATHING: 0
BODYLANGUAGE: 1
FACIALEXPRESSION: 1
FACIALEXPRESSION: 2
BREATHING: 0
TOTALSCORE: 4
BODYLANGUAGE: 1
FACIALEXPRESSION: 1
NEGVOCALIZATION: 1
CONSOLABILITY: 1
NEGVOCALIZATION: 1
BODYLANGUAGE: 0

## 2023-06-28 ASSESSMENT — PAIN SCALES - GENERAL
PAINLEVEL_OUTOF10: 4
PAINLEVEL_OUTOF10: 5
PAINLEVEL_OUTOF10: 6

## 2023-06-28 ASSESSMENT — PAIN DESCRIPTION - ORIENTATION
ORIENTATION: MID
ORIENTATION: LOWER

## 2023-06-28 ASSESSMENT — PAIN SCALES - WONG BAKER: WONGBAKER_NUMERICALRESPONSE: 2

## 2023-06-28 ASSESSMENT — PAIN - FUNCTIONAL ASSESSMENT: PAIN_FUNCTIONAL_ASSESSMENT: ACTIVITIES ARE NOT PREVENTED

## 2023-06-29 ENCOUNTER — ANESTHESIA (OUTPATIENT)
Dept: ENDOSCOPY | Age: 88
DRG: 056 | End: 2023-06-29
Payer: MEDICARE

## 2023-06-29 VITALS
WEIGHT: 109.57 LBS | HEART RATE: 115 BPM | SYSTOLIC BLOOD PRESSURE: 151 MMHG | BODY MASS INDEX: 21.51 KG/M2 | OXYGEN SATURATION: 100 % | DIASTOLIC BLOOD PRESSURE: 75 MMHG | RESPIRATION RATE: 18 BRPM | HEIGHT: 60 IN | TEMPERATURE: 98.4 F

## 2023-06-29 LAB
GLUCOSE BLD-MCNC: 103 MG/DL (ref 70–99)
GLUCOSE BLD-MCNC: 127 MG/DL (ref 70–99)
GLUCOSE BLD-MCNC: 136 MG/DL (ref 70–99)
GLUCOSE BLD-MCNC: 138 MG/DL (ref 70–99)

## 2023-06-29 PROCEDURE — C9113 INJ PANTOPRAZOLE SODIUM, VIA: HCPCS | Performed by: INTERNAL MEDICINE

## 2023-06-29 PROCEDURE — 99211 OFF/OP EST MAY X REQ PHY/QHP: CPT

## 2023-06-29 PROCEDURE — 97535 SELF CARE MNGMENT TRAINING: CPT

## 2023-06-29 PROCEDURE — 94761 N-INVAS EAR/PLS OXIMETRY MLT: CPT

## 2023-06-29 PROCEDURE — 85610 PROTHROMBIN TIME: CPT

## 2023-06-29 PROCEDURE — 85730 THROMBOPLASTIN TIME PARTIAL: CPT

## 2023-06-29 PROCEDURE — 6360000002 HC RX W HCPCS: Performed by: PHYSICAL MEDICINE & REHABILITATION

## 2023-06-29 PROCEDURE — 82962 GLUCOSE BLOOD TEST: CPT

## 2023-06-29 PROCEDURE — 83690 ASSAY OF LIPASE: CPT

## 2023-06-29 PROCEDURE — 2580000003 HC RX 258: Performed by: PHYSICAL MEDICINE & REHABILITATION

## 2023-06-29 PROCEDURE — 6360000002 HC RX W HCPCS: Performed by: INTERNAL MEDICINE

## 2023-06-29 RX ORDER — SODIUM CHLORIDE, SODIUM LACTATE, POTASSIUM CHLORIDE, CALCIUM CHLORIDE 600; 310; 30; 20 MG/100ML; MG/100ML; MG/100ML; MG/100ML
INJECTION, SOLUTION INTRAVENOUS CONTINUOUS PRN
Status: DISCONTINUED | OUTPATIENT
Start: 2023-06-29 | End: 2023-06-29

## 2023-06-29 RX ORDER — ONDANSETRON 4 MG/1
4 TABLET, ORALLY DISINTEGRATING ORAL EVERY 6 HOURS PRN
Status: CANCELLED | OUTPATIENT
Start: 2023-06-29

## 2023-06-29 RX ORDER — GLUCAGON 1 MG/ML
1 KIT INJECTION PRN
Status: CANCELLED | OUTPATIENT
Start: 2023-06-29

## 2023-06-29 RX ORDER — ATORVASTATIN CALCIUM 40 MG/1
40 TABLET, FILM COATED ORAL NIGHTLY
Status: CANCELLED | OUTPATIENT
Start: 2023-06-29

## 2023-06-29 RX ORDER — SODIUM CHLORIDE 0.9 % (FLUSH) 0.9 %
5-40 SYRINGE (ML) INJECTION PRN
Status: CANCELLED | OUTPATIENT
Start: 2023-06-29

## 2023-06-29 RX ORDER — DEXTROSE MONOHYDRATE 100 MG/ML
INJECTION, SOLUTION INTRAVENOUS CONTINUOUS PRN
Status: CANCELLED | OUTPATIENT
Start: 2023-06-29

## 2023-06-29 RX ORDER — AMLODIPINE BESYLATE 5 MG/1
5 TABLET ORAL DAILY
Status: CANCELLED | OUTPATIENT
Start: 2023-06-30

## 2023-06-29 RX ORDER — GUAIFENESIN 200 MG/10ML
200 LIQUID ORAL 4 TIMES DAILY
Status: CANCELLED | OUTPATIENT
Start: 2023-06-29

## 2023-06-29 RX ORDER — SODIUM CHLORIDE 0.9 % (FLUSH) 0.9 %
5-40 SYRINGE (ML) INJECTION PRN
Status: DISCONTINUED | OUTPATIENT
Start: 2023-06-29 | End: 2023-06-29 | Stop reason: HOSPADM

## 2023-06-29 RX ORDER — LORAZEPAM 0.5 MG/1
0.25 TABLET ORAL 2 TIMES DAILY
Status: CANCELLED | OUTPATIENT
Start: 2023-06-29

## 2023-06-29 RX ORDER — INSULIN LISPRO 100 [IU]/ML
0-4 INJECTION, SOLUTION INTRAVENOUS; SUBCUTANEOUS EVERY 6 HOURS
Status: CANCELLED | OUTPATIENT
Start: 2023-06-29

## 2023-06-29 RX ORDER — SODIUM CHLORIDE 0.9 % (FLUSH) 0.9 %
5-40 SYRINGE (ML) INJECTION EVERY 12 HOURS SCHEDULED
Status: DISCONTINUED | OUTPATIENT
Start: 2023-06-29 | End: 2023-06-29 | Stop reason: HOSPADM

## 2023-06-29 RX ORDER — INSULIN LISPRO 100 [IU]/ML
0-4 INJECTION, SOLUTION INTRAVENOUS; SUBCUTANEOUS NIGHTLY
Status: CANCELLED | OUTPATIENT
Start: 2023-06-29

## 2023-06-29 RX ORDER — POLYETHYLENE GLYCOL 3350 17 G/17G
17 POWDER, FOR SOLUTION ORAL DAILY PRN
Status: CANCELLED | OUTPATIENT
Start: 2023-06-29

## 2023-06-29 RX ORDER — PANTOPRAZOLE SODIUM 40 MG/10ML
40 INJECTION, POWDER, LYOPHILIZED, FOR SOLUTION INTRAVENOUS 2 TIMES DAILY
Status: CANCELLED | OUTPATIENT
Start: 2023-06-29

## 2023-06-29 RX ORDER — LIDOCAINE HYDROCHLORIDE 10 MG/ML
5 INJECTION, SOLUTION EPIDURAL; INFILTRATION; INTRACAUDAL; PERINEURAL ONCE
Status: DISCONTINUED | OUTPATIENT
Start: 2023-06-29 | End: 2023-06-29 | Stop reason: HOSPADM

## 2023-06-29 RX ORDER — BISACODYL 10 MG
10 SUPPOSITORY, RECTAL RECTAL DAILY PRN
Status: CANCELLED | OUTPATIENT
Start: 2023-06-29

## 2023-06-29 RX ORDER — SODIUM CHLORIDE 9 MG/ML
INJECTION, SOLUTION INTRAVENOUS PRN
Status: CANCELLED | OUTPATIENT
Start: 2023-06-29

## 2023-06-29 RX ORDER — SODIUM CHLORIDE 9 MG/ML
INJECTION, SOLUTION INTRAVENOUS PRN
Status: DISCONTINUED | OUTPATIENT
Start: 2023-06-29 | End: 2023-06-29 | Stop reason: HOSPADM

## 2023-06-29 RX ORDER — ASPIRIN 81 MG/1
81 TABLET, CHEWABLE ORAL DAILY
Status: CANCELLED | OUTPATIENT
Start: 2023-06-30

## 2023-06-29 RX ORDER — SODIUM CHLORIDE 0.9 % (FLUSH) 0.9 %
5-40 SYRINGE (ML) INJECTION EVERY 12 HOURS SCHEDULED
Status: CANCELLED | OUTPATIENT
Start: 2023-06-29

## 2023-06-29 RX ORDER — ACETAMINOPHEN 325 MG/1
650 TABLET ORAL EVERY 4 HOURS PRN
Status: CANCELLED | OUTPATIENT
Start: 2023-06-29

## 2023-06-29 RX ORDER — IPRATROPIUM BROMIDE AND ALBUTEROL SULFATE 2.5; .5 MG/3ML; MG/3ML
1 SOLUTION RESPIRATORY (INHALATION) EVERY 4 HOURS PRN
Status: CANCELLED | OUTPATIENT
Start: 2023-06-29

## 2023-06-29 RX ORDER — ONDANSETRON 2 MG/ML
4 INJECTION INTRAMUSCULAR; INTRAVENOUS EVERY 6 HOURS PRN
Status: CANCELLED | OUTPATIENT
Start: 2023-06-29

## 2023-06-29 RX ORDER — LORAZEPAM 2 MG/ML
0.5 INJECTION INTRAMUSCULAR EVERY 6 HOURS PRN
Status: CANCELLED | OUTPATIENT
Start: 2023-06-29

## 2023-06-29 RX ADMIN — CEFEPIME 2000 MG: 2 INJECTION, POWDER, FOR SOLUTION INTRAVENOUS at 09:25

## 2023-06-29 RX ADMIN — PANTOPRAZOLE SODIUM 40 MG: 40 INJECTION, POWDER, FOR SOLUTION INTRAVENOUS at 09:14

## 2023-06-29 RX ADMIN — HYDROMORPHONE HYDROCHLORIDE 0.25 MG: 1 INJECTION, SOLUTION INTRAMUSCULAR; INTRAVENOUS; SUBCUTANEOUS at 09:15

## 2023-06-29 RX ADMIN — LORAZEPAM 0.5 MG: 2 INJECTION INTRAMUSCULAR; INTRAVENOUS at 05:39

## 2023-06-29 RX ADMIN — HYDROMORPHONE HYDROCHLORIDE 0.25 MG: 1 INJECTION, SOLUTION INTRAMUSCULAR; INTRAVENOUS; SUBCUTANEOUS at 02:57

## 2023-06-29 RX ADMIN — LORAZEPAM 0.5 MG: 2 INJECTION INTRAMUSCULAR; INTRAVENOUS at 12:47

## 2023-06-29 RX ADMIN — HYDROMORPHONE HYDROCHLORIDE 0.25 MG: 1 INJECTION, SOLUTION INTRAMUSCULAR; INTRAVENOUS; SUBCUTANEOUS at 14:22

## 2023-06-29 ASSESSMENT — PAIN SCALES - PAIN ASSESSMENT IN ADVANCED DEMENTIA (PAINAD)
NEGVOCALIZATION: 1
BODYLANGUAGE: 0
CONSOLABILITY: 1
TOTALSCORE: 3
NEGVOCALIZATION: 2
TOTALSCORE: 3
FACIALEXPRESSION: 1
CONSOLABILITY: 1
BREATHING: 1
BODYLANGUAGE: 1
NEGVOCALIZATION: 1
FACIALEXPRESSION: 1
CONSOLABILITY: 1
NEGVOCALIZATION: 1
BREATHING: 0
BODYLANGUAGE: 0
TOTALSCORE: 3
BODYLANGUAGE: 0
BREATHING: 0
FACIALEXPRESSION: 1
FACIALEXPRESSION: 1
BREATHING: 0
CONSOLABILITY: 1
TOTALSCORE: 6

## 2023-06-29 ASSESSMENT — PAIN DESCRIPTION - LOCATION: LOCATION: ABDOMEN

## 2023-06-29 ASSESSMENT — PAIN DESCRIPTION - DESCRIPTORS: DESCRIPTORS: PATIENT UNABLE TO DESCRIBE

## 2023-06-29 ASSESSMENT — PAIN SCALES - WONG BAKER: WONGBAKER_NUMERICALRESPONSE: 6

## 2023-06-29 ASSESSMENT — PAIN - FUNCTIONAL ASSESSMENT
PAIN_FUNCTIONAL_ASSESSMENT: ACTIVITIES ARE NOT PREVENTED
PAIN_FUNCTIONAL_ASSESSMENT: WONG-BAKER FACES

## 2023-06-29 ASSESSMENT — PAIN SCALES - GENERAL
PAINLEVEL_OUTOF10: 5
PAINLEVEL_OUTOF10: 6